# Patient Record
Sex: FEMALE | Race: BLACK OR AFRICAN AMERICAN | NOT HISPANIC OR LATINO | Employment: FULL TIME | URBAN - METROPOLITAN AREA
[De-identification: names, ages, dates, MRNs, and addresses within clinical notes are randomized per-mention and may not be internally consistent; named-entity substitution may affect disease eponyms.]

---

## 2022-02-10 ENCOUNTER — OFFICE VISIT (OUTPATIENT)
Dept: OBSTETRICS AND GYNECOLOGY | Facility: CLINIC | Age: 27
End: 2022-02-10
Payer: MEDICAID

## 2022-02-10 ENCOUNTER — LAB VISIT (OUTPATIENT)
Dept: LAB | Facility: HOSPITAL | Age: 27
End: 2022-02-10
Attending: NURSE PRACTITIONER
Payer: MEDICAID

## 2022-02-10 VITALS — SYSTOLIC BLOOD PRESSURE: 110 MMHG | DIASTOLIC BLOOD PRESSURE: 64 MMHG | WEIGHT: 169.75 LBS

## 2022-02-10 DIAGNOSIS — Z11.3 SCREEN FOR STD (SEXUALLY TRANSMITTED DISEASE): ICD-10-CM

## 2022-02-10 DIAGNOSIS — Z12.4 ENCOUNTER FOR SCREENING FOR CERVICAL CANCER: ICD-10-CM

## 2022-02-10 DIAGNOSIS — Z01.419 WELL WOMAN EXAM WITH ROUTINE GYNECOLOGICAL EXAM: Primary | ICD-10-CM

## 2022-02-10 PROCEDURE — 99385 PR PREVENTIVE VISIT,NEW,18-39: ICD-10-PCS | Mod: S$PBB,,, | Performed by: NURSE PRACTITIONER

## 2022-02-10 PROCEDURE — 3078F PR MOST RECENT DIASTOLIC BLOOD PRESSURE < 80 MM HG: ICD-10-PCS | Mod: CPTII,,, | Performed by: NURSE PRACTITIONER

## 2022-02-10 PROCEDURE — 87801 DETECT AGNT MULT DNA AMPLI: CPT | Performed by: NURSE PRACTITIONER

## 2022-02-10 PROCEDURE — 99999 PR PBB SHADOW E&M-EST. PATIENT-LVL III: CPT | Mod: PBBFAC,,, | Performed by: NURSE PRACTITIONER

## 2022-02-10 PROCEDURE — 88175 CYTOPATH C/V AUTO FLUID REDO: CPT | Performed by: NURSE PRACTITIONER

## 2022-02-10 PROCEDURE — 99999 PR PBB SHADOW E&M-EST. PATIENT-LVL III: ICD-10-PCS | Mod: PBBFAC,,, | Performed by: NURSE PRACTITIONER

## 2022-02-10 PROCEDURE — 3078F DIAST BP <80 MM HG: CPT | Mod: CPTII,,, | Performed by: NURSE PRACTITIONER

## 2022-02-10 PROCEDURE — 1160F PR REVIEW ALL MEDS BY PRESCRIBER/CLIN PHARMACIST DOCUMENTED: ICD-10-PCS | Mod: CPTII,,, | Performed by: NURSE PRACTITIONER

## 2022-02-10 PROCEDURE — 1159F PR MEDICATION LIST DOCUMENTED IN MEDICAL RECORD: ICD-10-PCS | Mod: CPTII,,, | Performed by: NURSE PRACTITIONER

## 2022-02-10 PROCEDURE — 86592 SYPHILIS TEST NON-TREP QUAL: CPT | Performed by: NURSE PRACTITIONER

## 2022-02-10 PROCEDURE — 87481 CANDIDA DNA AMP PROBE: CPT | Mod: 59 | Performed by: NURSE PRACTITIONER

## 2022-02-10 PROCEDURE — 80074 ACUTE HEPATITIS PANEL: CPT | Performed by: NURSE PRACTITIONER

## 2022-02-10 PROCEDURE — 99213 OFFICE O/P EST LOW 20 MIN: CPT | Mod: PBBFAC | Performed by: NURSE PRACTITIONER

## 2022-02-10 PROCEDURE — 1160F RVW MEDS BY RX/DR IN RCRD: CPT | Mod: CPTII,,, | Performed by: NURSE PRACTITIONER

## 2022-02-10 PROCEDURE — 3074F PR MOST RECENT SYSTOLIC BLOOD PRESSURE < 130 MM HG: ICD-10-PCS | Mod: CPTII,,, | Performed by: NURSE PRACTITIONER

## 2022-02-10 PROCEDURE — 87389 HIV-1 AG W/HIV-1&-2 AB AG IA: CPT | Performed by: NURSE PRACTITIONER

## 2022-02-10 PROCEDURE — 87591 N.GONORRHOEAE DNA AMP PROB: CPT | Mod: 59 | Performed by: NURSE PRACTITIONER

## 2022-02-10 PROCEDURE — 1159F MED LIST DOCD IN RCRD: CPT | Mod: CPTII,,, | Performed by: NURSE PRACTITIONER

## 2022-02-10 PROCEDURE — 87491 CHLMYD TRACH DNA AMP PROBE: CPT | Performed by: NURSE PRACTITIONER

## 2022-02-10 PROCEDURE — 36415 COLL VENOUS BLD VENIPUNCTURE: CPT | Performed by: NURSE PRACTITIONER

## 2022-02-10 PROCEDURE — 3074F SYST BP LT 130 MM HG: CPT | Mod: CPTII,,, | Performed by: NURSE PRACTITIONER

## 2022-02-10 PROCEDURE — 99385 PREV VISIT NEW AGE 18-39: CPT | Mod: S$PBB,,, | Performed by: NURSE PRACTITIONER

## 2022-02-10 NOTE — PATIENT INSTRUCTIONS
Patient Education       Gynecological Exam   About this topic   A gynecologic exam is sometimes called a pelvic exam. Talk with your doctor to decide when and how often you need to have pelvic exams. You may have a pelvic exam:  · As a part of a health check-up  · If you have symptoms, like vaginal discharge or pain  · To do a Pap or HPV test to screen for cervical cancer. HPV is short for human papillomavirus, which is a virus that causes cervical cancer.  · If you have a family history of cervical cancer  · If your sexual partner has a sexually-transmitted disease  · If you have bleeding between periods  · Before your doctor orders certain kinds of birth control  · At the start of a pregnancy  Your doctor will take your history. Talk to the doctor about:  · All the drugs you are taking. Be sure to include all prescription and over-the-counter (OTC) drugs and herbal supplements. Tell the doctor about any drug allergy. Bring a list of drugs you take with you.  · If you are pregnant or think you might be pregnant  · If you have had a baby and how the baby was delivered  · If you have had a miscarriage or an   · If you have your menstrual period  · If you are sexually active  · Any vaccinations you have had, such as the HPV vaccine  Be sure to let the doctor know if you have a history of an abnormal Pap test. Also, tell the doctor about:  · Past cervical procedures  · HPV or other sexually-transmitted diseases  · Vaginal secretions and infections  · Past surgery, radiation, or chemo  Your gynecologic exam is also a good time to talk with your doctor about things like:  · Birth control  · Problems with your menstrual cycle  · If you are trying to get pregnant or are having trouble getting pregnant  · Sex and sexuality  · Gender identity  · Your emotional health  · Changes in appetite  · If you dont feel safe in a relationship  · Questions about taking hormones  · Sexually-transmitted infections  · Shots to  prevent infections  · If you use tobacco, alcohol, or illegal substances  General   The doctor will ask you to lie on an exam table and put your feet in foot holders. The exam may be a little uncomfortable but should not hurt. If it would make you more comfortable, ask to have a nurse or family member with you during the exam. Remember to breathe and try to relax your stomach, butt, and leg muscles. There are a few parts to a pelvic exam:  · External exam - The doctor looks at your vulva and the opening of your vagina.  · Internal exam with a speculum - The doctor puts a special tool called a speculum into your vagina. The speculum helps keep your vagina open so the doctor is able to see your cervix. The doctor may use swabs to collect a sample of cells from your cervix during this part of the exam.  · Bimanual exam - The doctor will gently place 1 or 2 gloved fingers into your vagina and use the other hand to press slightly on your lower belly. This lets the doctor check your uterus and ovaries.  · Rectovaginal exam - The doctor may place a gloved finger in your rectum to check the muscles between your vagina and your anus. The doctor may also place a finger in your vagina at the same time.  The doctor may also do a breast exam as a part of your gynecologic exam. This is to look for lumps, cysts, or drainage. The doctor may do a test to check the cells of your cervix for:  · Cancer cells  · Cells that are not normal and may lead to cancer  · Changes in your cervix  · Swelling or infections  Talk to the doctor about when you can get any test results.  Where can I learn more?   ACOG  https://www.acog.org/womens-health/faqs/your-first-gynecologic-visit   [employer]  https://www.[employer].com/contents/cervical-cancer-screening-beyond-the-basics   Last Reviewed Date   2021-03-31  Consumer Information Use and Disclaimer   This information is not specific medical advice and does not replace information you receive from your  health care provider. This is only a brief summary of general information. It does NOT include all information about conditions, illnesses, injuries, tests, procedures, treatments, therapies, discharge instructions or life-style choices that may apply to you. You must talk with your health care provider for complete information about your health and treatment options. This information should not be used to decide whether or not to accept your health care providers advice, instructions or recommendations. Only your health care provider has the knowledge and training to provide advice that is right for you.  Copyright   Copyright © 2021 [employer] and its affiliates and/or licensors. All rights reserved.

## 2022-02-10 NOTE — PROGRESS NOTES
Subjective:       Patient ID: Luisa Callaway is a 26 y.o. female.    Chief Complaint:  Well Woman and STD CHECK    Patient's last menstrual period was 2022.  History of Present Illness  Annual Exam-Premenopausal  Patient presents for annual exam. The patient is sexually active. GYN screening history: last pap: patient does not recall results of last pap. The patient wears seatbelts: yes. The patient participates in regular exercise: no. Has the patient ever been transfused or tattooed?: yes. The patient reports that there is not domestic violence in her life.  Patient has complaints of increased vaginal discharge with associated vaginal odor.  Denies vaginal itching.  Patient does report history of recurrent bacterial vaginosis.  Patient requesting STD screening today.  Patient also requesting appointment for IUD removal.  Paragard IUD inserted 5-6 years ago. Patient reports irregular menstrual cycles and dysmenorrhea since insertion.     OB History    Para Term  AB Living   2 2 2     2   SAB IAB Ectopic Multiple Live Births           2      # Outcome Date GA Lbr Isidro/2nd Weight Sex Delivery Anes PTL Lv   2 Term 16 38w0d  2.523 kg (5 lb 9 oz) F Vag-Spont EPI  JOSE   1 Term 09/20/15 38w0d  2.523 kg (5 lb 9 oz) F Vag-Spont EPI  JOSE       Review of Systems  Review of Systems   Constitutional: Negative for appetite change, fatigue, fever and unexpected weight change.   Eyes: Negative for visual disturbance.   Cardiovascular: Negative for chest pain.   Gastrointestinal: Negative for abdominal pain, bloating, constipation, diarrhea, nausea and vomiting.   Genitourinary: Positive for dysmenorrhea, menstrual problem, vaginal discharge and vaginal odor. Negative for bladder incontinence, dyspareunia, dysuria, flank pain, frequency, genital sores, menorrhagia, pelvic pain, urgency, vaginal bleeding, vaginal pain, postcoital bleeding and vaginal dryness.   Integumentary:  Negative for rash, acne,  mole/lesion, breast mass, nipple discharge, breast skin changes and breast tenderness.   Neurological: Negative for syncope and headaches.   Hematological: Negative for adenopathy. Does not bruise/bleed easily.   All other systems reviewed and are negative.  Breast: Positive for breast self exam.Negative for asymmetry, lump, mass, nipple discharge, skin changes and tenderness           Objective:      Physical Exam:   Constitutional: She is oriented to person, place, and time. She appears well-developed and well-nourished.    HENT:   Head: Normocephalic and atraumatic.    Eyes: Pupils are equal, round, and reactive to light. Conjunctivae and EOM are normal.     Cardiovascular: Normal rate and regular rhythm.     Pulmonary/Chest: Effort normal. Right breast exhibits no inverted nipple, no mass, no nipple discharge, no skin change, no tenderness, no bleeding and no swelling. Left breast exhibits no inverted nipple, no mass, no nipple discharge, no skin change, no tenderness, no bleeding and no swelling. Breasts are symmetrical.        Abdominal: Soft. Hernia confirmed negative in the right inguinal area and confirmed negative in the left inguinal area.     Genitourinary:    Inguinal canal, uterus, right adnexa, left adnexa and rectum normal.      Pelvic exam was performed with patient supine.   The external female genitalia was normal.   Genitalia hair distrobution normal .   Labial bartholins normal.There is no rash, tenderness, lesion or injury on the right labia. There is no rash, tenderness, lesion or injury on the left labia. Cervix is normal. There is vaginal discharge (White, thin, creamy) in the vagina. No erythema, bleeding, rectocele, cystocele or unspecified prolapse of vaginal walls in the vagina. Cervix exhibits no motion tenderness and no friability. Uterus is not tender.           Musculoskeletal: Normal range of motion and moves all extremeties.      Lymphadenopathy: No inguinal adenopathy noted on the  right or left side.    Neurological: She is alert and oriented to person, place, and time.    Skin: Skin is warm and dry. No rash noted. No erythema.    Psychiatric: She has a normal mood and affect. Her behavior is normal. Judgment and thought content normal.            Assessment:     1. Well woman exam with routine gynecological exam    2. Encounter for screening for cervical cancer    3. Screen for STD (sexually transmitted disease)              Plan:   Luisa was seen today for well woman and std check.    Diagnoses and all orders for this visit:    Well woman exam with routine gynecological exam  -     Liquid-Based Pap Smear, Screening    Encounter for screening for cervical cancer  -     Liquid-Based Pap Smear, Screening    Screen for STD (sexually transmitted disease)  -     HIV 1/2 Ag/Ab (4th Gen); Future  -     RPR; Future  -     Hepatitis Panel, Acute; Future  -     C. trachomatis/N. gonorrhoeae by AMP DNA  -     Vaginosis Screen by DNA Probe    Will schedule appt for IUD removal.   Follow up with me in 1 year for annual well woman exam.

## 2022-02-11 LAB
HAV IGM SERPL QL IA: NEGATIVE
HBV CORE IGM SERPL QL IA: NEGATIVE
HBV SURFACE AG SERPL QL IA: NEGATIVE
HCV AB SERPL QL IA: NEGATIVE
HIV 1+2 AB+HIV1 P24 AG SERPL QL IA: NEGATIVE
RPR SER QL: NORMAL

## 2022-02-12 LAB
C TRACH DNA SPEC QL NAA+PROBE: NOT DETECTED
N GONORRHOEA DNA SPEC QL NAA+PROBE: NOT DETECTED

## 2022-02-14 ENCOUNTER — PROCEDURE VISIT (OUTPATIENT)
Dept: OBSTETRICS AND GYNECOLOGY | Facility: CLINIC | Age: 27
End: 2022-02-14
Payer: MEDICAID

## 2022-02-14 VITALS — SYSTOLIC BLOOD PRESSURE: 124 MMHG | WEIGHT: 169.75 LBS | DIASTOLIC BLOOD PRESSURE: 86 MMHG

## 2022-02-14 DIAGNOSIS — Z30.432 ENCOUNTER FOR IUD REMOVAL: Primary | ICD-10-CM

## 2022-02-14 PROCEDURE — 58301 REMOVE INTRAUTERINE DEVICE: CPT | Mod: PBBFAC | Performed by: NURSE PRACTITIONER

## 2022-02-14 PROCEDURE — 58301 REMOVAL OF INTRAUTERINE DEVICE-TODAY: ICD-10-PCS | Mod: S$PBB,,, | Performed by: NURSE PRACTITIONER

## 2022-02-14 PROCEDURE — 58301 REMOVE INTRAUTERINE DEVICE: CPT | Mod: S$PBB,,, | Performed by: NURSE PRACTITIONER

## 2022-02-14 NOTE — PROCEDURES
Removal of Intrauterine Device-Today    Date/Time: 2/14/2022 8:45 AM  Performed by: Candis Mcdonnell NP  Authorized by: Candis Mcdonnell NP     Consent obtained:  Verbal  Consent given by:  Patient  Procedure risks and benefits discussed: yes    Patient questions answered: yes    Patient agrees, verbalizes understanding, and wants to proceed: yes    Educational handouts given: yes    Instructions and paperwork completed: yes    IUD grasped by: ring forceps  Removed with no complications: yes     Patient desires to proceed with abstinence for contraception.

## 2022-02-15 DIAGNOSIS — N76.0 BACTERIAL VAGINOSIS: Primary | ICD-10-CM

## 2022-02-15 DIAGNOSIS — B96.89 BACTERIAL VAGINOSIS: Primary | ICD-10-CM

## 2022-02-15 LAB
BACTERIAL VAGINOSIS DNA: POSITIVE
CANDIDA GLABRATA DNA: NEGATIVE
CANDIDA KRUSEI DNA: NEGATIVE
CANDIDA RRNA VAG QL PROBE: NEGATIVE
T VAGINALIS RRNA GENITAL QL PROBE: NEGATIVE

## 2022-02-15 RX ORDER — METRONIDAZOLE 500 MG/1
500 TABLET ORAL 2 TIMES DAILY
Qty: 14 TABLET | Refills: 0 | Status: SHIPPED | OUTPATIENT
Start: 2022-02-15 | End: 2022-02-22

## 2022-11-11 ENCOUNTER — OFFICE VISIT (OUTPATIENT)
Dept: OBSTETRICS AND GYNECOLOGY | Facility: CLINIC | Age: 27
End: 2022-11-11
Payer: MEDICAID

## 2022-11-11 ENCOUNTER — LAB VISIT (OUTPATIENT)
Dept: LAB | Facility: HOSPITAL | Age: 27
End: 2022-11-11
Attending: ADVANCED PRACTICE MIDWIFE
Payer: MEDICAID

## 2022-11-11 VITALS
WEIGHT: 160.25 LBS | HEIGHT: 62 IN | DIASTOLIC BLOOD PRESSURE: 86 MMHG | SYSTOLIC BLOOD PRESSURE: 124 MMHG | BODY MASS INDEX: 29.49 KG/M2

## 2022-11-11 DIAGNOSIS — N76.0 BV (BACTERIAL VAGINOSIS): ICD-10-CM

## 2022-11-11 DIAGNOSIS — Z32.01 PREGNANCY EXAMINATION OR TEST, POSITIVE RESULT: ICD-10-CM

## 2022-11-11 DIAGNOSIS — B96.89 BV (BACTERIAL VAGINOSIS): ICD-10-CM

## 2022-11-11 DIAGNOSIS — A74.9 CHLAMYDIA INFECTION AFFECTING PREGNANCY IN FIRST TRIMESTER: ICD-10-CM

## 2022-11-11 DIAGNOSIS — O98.811 CHLAMYDIA INFECTION AFFECTING PREGNANCY IN FIRST TRIMESTER: ICD-10-CM

## 2022-11-11 DIAGNOSIS — Z32.00 POSSIBLE PREGNANCY, NOT CONFIRMED: Primary | ICD-10-CM

## 2022-11-11 LAB
BASOPHILS # BLD AUTO: 0.07 K/UL (ref 0–0.2)
BASOPHILS NFR BLD: 0.8 % (ref 0–1.9)
DIFFERENTIAL METHOD: ABNORMAL
EOSINOPHIL # BLD AUTO: 0.1 K/UL (ref 0–0.5)
EOSINOPHIL NFR BLD: 1.4 % (ref 0–8)
ERYTHROCYTE [DISTWIDTH] IN BLOOD BY AUTOMATED COUNT: 12.2 % (ref 11.5–14.5)
ESTIMATED AVG GLUCOSE: 85 MG/DL (ref 68–131)
HBA1C MFR BLD: 4.6 % (ref 4–5.6)
HCT VFR BLD AUTO: 40.1 % (ref 37–48.5)
HGB BLD-MCNC: 13.3 G/DL (ref 12–16)
IMM GRANULOCYTES # BLD AUTO: 0.04 K/UL (ref 0–0.04)
IMM GRANULOCYTES NFR BLD AUTO: 0.4 % (ref 0–0.5)
LYMPHOCYTES # BLD AUTO: 3.1 K/UL (ref 1–4.8)
LYMPHOCYTES NFR BLD: 34.2 % (ref 18–48)
MCH RBC QN AUTO: 32 PG (ref 27–31)
MCHC RBC AUTO-ENTMCNC: 33.2 G/DL (ref 32–36)
MCV RBC AUTO: 96 FL (ref 82–98)
MONOCYTES # BLD AUTO: 0.5 K/UL (ref 0.3–1)
MONOCYTES NFR BLD: 5.6 % (ref 4–15)
NEUTROPHILS # BLD AUTO: 5.2 K/UL (ref 1.8–7.7)
NEUTROPHILS NFR BLD: 57.6 % (ref 38–73)
NRBC BLD-RTO: 0 /100 WBC
PLATELET # BLD AUTO: 287 K/UL (ref 150–450)
PMV BLD AUTO: 12.1 FL (ref 9.2–12.9)
RBC # BLD AUTO: 4.16 M/UL (ref 4–5.4)
WBC # BLD AUTO: 9 K/UL (ref 3.9–12.7)

## 2022-11-11 PROCEDURE — 3008F PR BODY MASS INDEX (BMI) DOCUMENTED: ICD-10-PCS | Mod: CPTII,,, | Performed by: ADVANCED PRACTICE MIDWIFE

## 2022-11-11 PROCEDURE — 87389 HIV-1 AG W/HIV-1&-2 AB AG IA: CPT | Performed by: ADVANCED PRACTICE MIDWIFE

## 2022-11-11 PROCEDURE — 99999 PR PBB SHADOW E&M-EST. PATIENT-LVL III: ICD-10-PCS | Mod: PBBFAC,,, | Performed by: ADVANCED PRACTICE MIDWIFE

## 2022-11-11 PROCEDURE — 3074F PR MOST RECENT SYSTOLIC BLOOD PRESSURE < 130 MM HG: ICD-10-PCS | Mod: CPTII,,, | Performed by: ADVANCED PRACTICE MIDWIFE

## 2022-11-11 PROCEDURE — 83020 HEMOGLOBIN ELECTROPHORESIS: CPT | Mod: 91 | Performed by: ADVANCED PRACTICE MIDWIFE

## 2022-11-11 PROCEDURE — 3044F HG A1C LEVEL LT 7.0%: CPT | Mod: CPTII,,, | Performed by: ADVANCED PRACTICE MIDWIFE

## 2022-11-11 PROCEDURE — 86803 HEPATITIS C AB TEST: CPT | Performed by: ADVANCED PRACTICE MIDWIFE

## 2022-11-11 PROCEDURE — 85025 COMPLETE CBC W/AUTO DIFF WBC: CPT | Performed by: ADVANCED PRACTICE MIDWIFE

## 2022-11-11 PROCEDURE — 3079F PR MOST RECENT DIASTOLIC BLOOD PRESSURE 80-89 MM HG: ICD-10-PCS | Mod: CPTII,,, | Performed by: ADVANCED PRACTICE MIDWIFE

## 2022-11-11 PROCEDURE — 87340 HEPATITIS B SURFACE AG IA: CPT | Performed by: ADVANCED PRACTICE MIDWIFE

## 2022-11-11 PROCEDURE — 86592 SYPHILIS TEST NON-TREP QUAL: CPT | Performed by: ADVANCED PRACTICE MIDWIFE

## 2022-11-11 PROCEDURE — 3074F SYST BP LT 130 MM HG: CPT | Mod: CPTII,,, | Performed by: ADVANCED PRACTICE MIDWIFE

## 2022-11-11 PROCEDURE — 36415 COLL VENOUS BLD VENIPUNCTURE: CPT | Performed by: ADVANCED PRACTICE MIDWIFE

## 2022-11-11 PROCEDURE — 87086 URINE CULTURE/COLONY COUNT: CPT | Performed by: ADVANCED PRACTICE MIDWIFE

## 2022-11-11 PROCEDURE — 1159F MED LIST DOCD IN RCRD: CPT | Mod: CPTII,,, | Performed by: ADVANCED PRACTICE MIDWIFE

## 2022-11-11 PROCEDURE — 99204 PR OFFICE/OUTPT VISIT, NEW, LEVL IV, 45-59 MIN: ICD-10-PCS | Mod: S$PBB,TH,, | Performed by: ADVANCED PRACTICE MIDWIFE

## 2022-11-11 PROCEDURE — 86901 BLOOD TYPING SEROLOGIC RH(D): CPT | Performed by: ADVANCED PRACTICE MIDWIFE

## 2022-11-11 PROCEDURE — 3079F DIAST BP 80-89 MM HG: CPT | Mod: CPTII,,, | Performed by: ADVANCED PRACTICE MIDWIFE

## 2022-11-11 PROCEDURE — 87491 CHLMYD TRACH DNA AMP PROBE: CPT | Performed by: ADVANCED PRACTICE MIDWIFE

## 2022-11-11 PROCEDURE — 3044F PR MOST RECENT HEMOGLOBIN A1C LEVEL <7.0%: ICD-10-PCS | Mod: CPTII,,, | Performed by: ADVANCED PRACTICE MIDWIFE

## 2022-11-11 PROCEDURE — 1159F PR MEDICATION LIST DOCUMENTED IN MEDICAL RECORD: ICD-10-PCS | Mod: CPTII,,, | Performed by: ADVANCED PRACTICE MIDWIFE

## 2022-11-11 PROCEDURE — 83036 HEMOGLOBIN GLYCOSYLATED A1C: CPT | Performed by: ADVANCED PRACTICE MIDWIFE

## 2022-11-11 PROCEDURE — 99204 OFFICE O/P NEW MOD 45 MIN: CPT | Mod: S$PBB,TH,, | Performed by: ADVANCED PRACTICE MIDWIFE

## 2022-11-11 PROCEDURE — 87591 N.GONORRHOEAE DNA AMP PROB: CPT | Performed by: ADVANCED PRACTICE MIDWIFE

## 2022-11-11 PROCEDURE — 80053 COMPREHEN METABOLIC PANEL: CPT | Performed by: ADVANCED PRACTICE MIDWIFE

## 2022-11-11 PROCEDURE — 81514 NFCT DS BV&VAGINITIS DNA ALG: CPT | Performed by: ADVANCED PRACTICE MIDWIFE

## 2022-11-11 PROCEDURE — 83021 HEMOGLOBIN CHROMOTOGRAPHY: CPT | Performed by: ADVANCED PRACTICE MIDWIFE

## 2022-11-11 PROCEDURE — 86762 RUBELLA ANTIBODY: CPT | Performed by: ADVANCED PRACTICE MIDWIFE

## 2022-11-11 PROCEDURE — 99213 OFFICE O/P EST LOW 20 MIN: CPT | Mod: PBBFAC,TH | Performed by: ADVANCED PRACTICE MIDWIFE

## 2022-11-11 PROCEDURE — 99999 PR PBB SHADOW E&M-EST. PATIENT-LVL III: CPT | Mod: PBBFAC,,, | Performed by: ADVANCED PRACTICE MIDWIFE

## 2022-11-11 PROCEDURE — 3008F BODY MASS INDEX DOCD: CPT | Mod: CPTII,,, | Performed by: ADVANCED PRACTICE MIDWIFE

## 2022-11-12 ENCOUNTER — PATIENT MESSAGE (OUTPATIENT)
Dept: OBSTETRICS AND GYNECOLOGY | Facility: CLINIC | Age: 27
End: 2022-11-12
Payer: MEDICAID

## 2022-11-12 PROBLEM — A74.9 CHLAMYDIA INFECTION AFFECTING PREGNANCY IN FIRST TRIMESTER: Status: ACTIVE | Noted: 2022-11-12

## 2022-11-12 PROBLEM — N76.0 BV (BACTERIAL VAGINOSIS): Status: ACTIVE | Noted: 2022-11-12

## 2022-11-12 PROBLEM — O98.811 CHLAMYDIA INFECTION AFFECTING PREGNANCY IN FIRST TRIMESTER: Status: ACTIVE | Noted: 2022-11-12

## 2022-11-12 PROBLEM — B96.89 BV (BACTERIAL VAGINOSIS): Status: ACTIVE | Noted: 2022-11-12

## 2022-11-12 LAB
ABO + RH BLD: NORMAL
ALBUMIN SERPL BCP-MCNC: 4.4 G/DL (ref 3.5–5.2)
ALP SERPL-CCNC: 52 U/L (ref 55–135)
ALT SERPL W/O P-5'-P-CCNC: 6 U/L (ref 10–44)
ANION GAP SERPL CALC-SCNC: 9 MMOL/L (ref 8–16)
AST SERPL-CCNC: 11 U/L (ref 10–40)
BACTERIAL VAGINOSIS DNA: POSITIVE
BILIRUB SERPL-MCNC: 0.7 MG/DL (ref 0.1–1)
BLD GP AB SCN CELLS X3 SERPL QL: NORMAL
BUN SERPL-MCNC: 8 MG/DL (ref 6–20)
C TRACH DNA SPEC QL NAA+PROBE: DETECTED
CALCIUM SERPL-MCNC: 9.2 MG/DL (ref 8.7–10.5)
CANDIDA GLABRATA DNA: NEGATIVE
CANDIDA KRUSEI DNA: NEGATIVE
CANDIDA RRNA VAG QL PROBE: NEGATIVE
CHLORIDE SERPL-SCNC: 105 MMOL/L (ref 95–110)
CO2 SERPL-SCNC: 22 MMOL/L (ref 23–29)
CREAT SERPL-MCNC: 0.7 MG/DL (ref 0.5–1.4)
EST. GFR  (NO RACE VARIABLE): >60 ML/MIN/1.73 M^2
GLUCOSE SERPL-MCNC: 84 MG/DL (ref 70–110)
HBV SURFACE AG SERPL QL IA: NORMAL
HCV AB SERPL QL IA: NORMAL
HIV 1+2 AB+HIV1 P24 AG SERPL QL IA: NORMAL
N GONORRHOEA DNA SPEC QL NAA+PROBE: NOT DETECTED
POTASSIUM SERPL-SCNC: 4 MMOL/L (ref 3.5–5.1)
PROT SERPL-MCNC: 7.2 G/DL (ref 6–8.4)
RPR SER QL: NORMAL
SODIUM SERPL-SCNC: 136 MMOL/L (ref 136–145)
T VAGINALIS RRNA GENITAL QL PROBE: NEGATIVE

## 2022-11-12 RX ORDER — METRONIDAZOLE 500 MG/1
500 TABLET ORAL EVERY 12 HOURS
Qty: 14 TABLET | Refills: 0 | Status: SHIPPED | OUTPATIENT
Start: 2022-11-12 | End: 2022-11-19

## 2022-11-12 RX ORDER — AZITHROMYCIN 500 MG/1
1000 TABLET, FILM COATED ORAL ONCE
Qty: 2 TABLET | Refills: 0 | Status: SHIPPED | OUTPATIENT
Start: 2022-11-12 | End: 2022-11-12

## 2022-11-12 NOTE — PROGRESS NOTES
Subjective:       Patient ID: Luisa Callaway is a 27 y.o. female.    Chief Complaint: Possible Pregnancy    Presents for amenorrhea, + UPT  LMP 8/4/22 approximate  Planned pregnancy.  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      Objective:      Physical Exam  Vitals reviewed.   Constitutional:       Appearance: Normal appearance.   Pulmonary:      Effort: Pulmonary effort is normal.   Abdominal:      Palpations: Abdomen is soft.   Genitourinary:     General: Normal vulva.      Comments: Pap up to date  Uterus 8-10 week size, mobile non-tender  Adnexa negative bilaterally  Musculoskeletal:         General: Normal range of motion.   Skin:     General: Skin is warm and dry.   Neurological:      General: No focal deficit present.      Mental Status: She is alert and oriented to person, place, and time. Mental status is at baseline.   Psychiatric:         Mood and Affect: Mood normal.         Behavior: Behavior normal.         Thought Content: Thought content normal.         Judgment: Judgment normal.       Assessment:       Problem List Items Addressed This Visit          Renal/    Pregnancy examination or test, positive result    Relevant Orders    Rubella antibody, IgG    Urine culture    Hemoglobin Electrophoresis,Hgb A2 Ashutosh.    US OB/GYN Procedure ([facility])-Future    BV (bacterial vaginosis)       ID    Chlamydia infection affecting pregnancy in first trimester     Other Visit Diagnoses       Possible pregnancy, not confirmed    -  Primary    Relevant Orders    POCT Urine Pregnancy              Plan:       NOB labs and cultures today  RTC 1-2 weeks with US and NOB visit    I spent a total of 20 minutes on the day of the visit.This includes face to face time and non-face to face time preparing to see the patient (eg, review of  tests), obtaining and/or reviewing separately obtained history, documenting clinical information in the electronic or other health record, independently interpreting results and communicating results to the patient/family/caregiver, or care coordinator.          Additional Notes: The E/M service provided during this visit was medically necessary, significant, and independent from the procedure(s) provided on the same date of service and included documented elements above and beyond the usual pre-, intra-, and post-operative work associated with the procedure(s). Quality 111:Pneumonia Vaccination Status For Older Adults: Pneumococcal Vaccination Previously Received Quality 226: Preventive Care And Screening: Tobacco Use: Screening And Cessation Intervention: Patient screened for tobacco use and is an ex/non-smoker Quality 431: Preventive Care And Screening: Unhealthy Alcohol Use - Screening: Patient not identified as an unhealthy alcohol user when screened for unhealthy alcohol use using a systematic screening method Quality 47: Advance Care Plan: Advance care planning not documented, reason not otherwise specified. Quality 130: Documentation Of Current Medications In The Medical Record: Current Medications Documented Detail Level: Detailed

## 2022-11-13 LAB — BACTERIA UR CULT: NORMAL

## 2022-11-14 LAB
RUBV IGG SER-ACNC: 19.6 IU/ML
RUBV IGG SER-IMP: REACTIVE

## 2022-11-15 LAB
HB ELECTROPHORESIS INTERP CANCEL: NORMAL
HB ELECTROPHORESIS INTERPRETATION: NORMAL
HGB A MFR BLD ELPH: 97 % (ref 95.8–98)
HGB A2 MFR BLD: 3 % (ref 2–3.3)
HGB A2+XXX MFR BLD ELPH: NORMAL %
HGB F MFR BLD: 0 % (ref 0–0.9)
HGB XXX MFR BLD ELPH: NORMAL %
HPLC HB VARIANT: NORMAL

## 2022-11-18 ENCOUNTER — PROCEDURE VISIT (OUTPATIENT)
Dept: OBSTETRICS AND GYNECOLOGY | Facility: CLINIC | Age: 27
End: 2022-11-18
Payer: MEDICAID

## 2022-11-18 ENCOUNTER — INITIAL PRENATAL (OUTPATIENT)
Dept: OBSTETRICS AND GYNECOLOGY | Facility: CLINIC | Age: 27
End: 2022-11-18
Payer: MEDICAID

## 2022-11-18 VITALS
WEIGHT: 163.13 LBS | SYSTOLIC BLOOD PRESSURE: 108 MMHG | BODY MASS INDEX: 29.84 KG/M2 | DIASTOLIC BLOOD PRESSURE: 74 MMHG

## 2022-11-18 DIAGNOSIS — Z3A.01 6 WEEKS GESTATION OF PREGNANCY: Primary | ICD-10-CM

## 2022-11-18 DIAGNOSIS — Z34.81 ENCOUNTER FOR SUPERVISION OF OTHER NORMAL PREGNANCY, FIRST TRIMESTER: ICD-10-CM

## 2022-11-18 DIAGNOSIS — Z32.01 PREGNANCY EXAMINATION OR TEST, POSITIVE RESULT: ICD-10-CM

## 2022-11-18 PROCEDURE — 76817 TRANSVAGINAL US OBSTETRIC: CPT | Mod: PBBFAC | Performed by: OBSTETRICS & GYNECOLOGY

## 2022-11-18 PROCEDURE — 99213 OFFICE O/P EST LOW 20 MIN: CPT | Mod: TH,S$PBB,25,

## 2022-11-18 PROCEDURE — 76817 US OB/GYN PROCEDURE (VIEWPOINT): ICD-10-PCS | Mod: 26,S$PBB,, | Performed by: OBSTETRICS & GYNECOLOGY

## 2022-11-18 PROCEDURE — 99212 OFFICE O/P EST SF 10 MIN: CPT | Mod: PBBFAC,TH

## 2022-11-18 PROCEDURE — 99213 PR OFFICE/OUTPT VISIT, EST, LEVL III, 20-29 MIN: ICD-10-PCS | Mod: TH,S$PBB,25,

## 2022-11-18 PROCEDURE — 99999 PR PBB SHADOW E&M-EST. PATIENT-LVL II: ICD-10-PCS | Mod: PBBFAC,,,

## 2022-11-18 PROCEDURE — 99999 PR PBB SHADOW E&M-EST. PATIENT-LVL II: CPT | Mod: PBBFAC,,,

## 2022-11-20 PROBLEM — Z32.01 PREGNANCY EXAMINATION OR TEST, POSITIVE RESULT: Status: RESOLVED | Noted: 2022-11-11 | Resolved: 2022-11-20

## 2022-11-20 PROBLEM — Z34.81 ENCOUNTER FOR SUPERVISION OF OTHER NORMAL PREGNANCY, FIRST TRIMESTER: Status: ACTIVE | Noted: 2022-11-20

## 2022-11-20 NOTE — PROGRESS NOTES
Chief Complaint   Patient presents with    Initial Prenatal Visit       27 y.o.,  at 6w1d by US on 22    Patient presents today for initial prenatal visit.    Complaints today: .  Doing well overall.     ROS  OBSTETRICS:   Cramping no   Bleeding no    GASTRO:   Nausea no   Vomiting no      OB History    Para Term  AB Living   3 2 2     2   SAB IAB Ectopic Multiple Live Births           2      # Outcome Date GA Lbr Isidro/2nd Weight Sex Delivery Anes PTL Lv   3 Current            2 Term 16 38w0d  2.523 kg (5 lb 9 oz) F Vag-Spont EPI  JOSE   1 Term 09/20/15 38w0d  2.523 kg (5 lb 9 oz) F Vag-Spont EPI  JOSE       Allergies and problem list reviewed and updated  Medical and surgical history reviewed    PHYSICAL EXAM  /74   Wt 74 kg (163 lb 2.3 oz)   LMP 2022   BMI 29.84 kg/m²     GENERAL: No acute distress  NEURO: Alert and oriented x3  PSYCH: Calm, normal mood and affect      ASSESSMENT AND PLAN    G3 Problems (from 22 to present)     No problems associated with this episode.            Initial labs reviewed: H/H 13.3/40.1, HIV Negative, RPR negative, blood type: O+, GC/CT positive chlamydia  Dating u/s reviewed: SVIUP with cardiac activity, , LOWELL 23, EGA 6w1d, fibroid 13 x 11 x 13mm  Urine culture  Normal.  Counseled today on proper weight gain based on the [place] [employer]'s recommendations based on her pre-pregnancy weight. Reviewed potential risks to excessive weight gain.  - Discussed IOM recommended weight gain of:   Weight category Pre pre BMI  Recommended TWG   Underweight Less than 18.5 28-40    Normal Weight 18.5-24.9  25-35    Overweight 25-29.9  15-25    Obese   30 and greater  11-20      Pre-pregnancy BMI over 40 or excess pregnancy weight gain defined as:   Pre-preg BMI < 18.5; Excess weight gain = > 60 pound   Pre-preg BMI 18.5-24.9;  Excess weight gain = > 53 pounds   Pre-preg BMI 25-29.9;  Excess weight gain = > 38 pounds   Pre-preg BMI  > 30;  Excess weight gain = > 30 pounds      Discussed prenatal vitamin options (i.e. stool softener, DHA).    Pregnancy course discussed and 'AtoZ' book given. Patient was counseled on proper weight gain based on the [place] [employer]'s recommendations based on her pre-pregnancy weight. Discussed foods to avoid in pregnancy (i.e. sushi, fish that are high in mercury, deli meat, and unpasteurized cheeses). Discussed prenatal vitamin options (i.e. stool softener, DHA). Discussed potential medical problems in pregnancy.  Oriented to practice including CNM collaboration.   Questions answered.    Reviewed aneuploidy screening options and indications, questions answered - patient discussed. Patient will think about it.  Education regarding warning signs.  Completed chlamydia abx. LATHA in 3-4 week    Follow up: 4 wks, call or present sooner prn.

## 2022-12-15 ENCOUNTER — PATIENT MESSAGE (OUTPATIENT)
Dept: ADMINISTRATIVE | Facility: OTHER | Age: 27
End: 2022-12-15
Payer: MEDICAID

## 2022-12-15 ENCOUNTER — ROUTINE PRENATAL (OUTPATIENT)
Dept: OBSTETRICS AND GYNECOLOGY | Facility: CLINIC | Age: 27
End: 2022-12-15
Payer: MEDICAID

## 2022-12-15 ENCOUNTER — LAB VISIT (OUTPATIENT)
Dept: LAB | Facility: HOSPITAL | Age: 27
End: 2022-12-15
Payer: MEDICAID

## 2022-12-15 VITALS
WEIGHT: 155.88 LBS | SYSTOLIC BLOOD PRESSURE: 137 MMHG | DIASTOLIC BLOOD PRESSURE: 94 MMHG | BODY MASS INDEX: 28.51 KG/M2

## 2022-12-15 DIAGNOSIS — Z3A.10 10 WEEKS GESTATION OF PREGNANCY: Primary | ICD-10-CM

## 2022-12-15 DIAGNOSIS — O21.9 NAUSEA/VOMITING IN PREGNANCY: ICD-10-CM

## 2022-12-15 DIAGNOSIS — A74.9 CHLAMYDIA INFECTION AFFECTING PREGNANCY IN FIRST TRIMESTER: ICD-10-CM

## 2022-12-15 DIAGNOSIS — Z13.79 GENETIC TESTING: ICD-10-CM

## 2022-12-15 DIAGNOSIS — O98.811 CHLAMYDIA INFECTION AFFECTING PREGNANCY IN FIRST TRIMESTER: ICD-10-CM

## 2022-12-15 PROBLEM — F32.9 MAJOR DEPRESSIVE DISORDER WITHOUT PSYCHOTIC FEATURES: Status: ACTIVE | Noted: 2018-08-24

## 2022-12-15 PROBLEM — F12.20 CANNABIS DEPENDENCE: Status: ACTIVE | Noted: 2018-08-28

## 2022-12-15 PROBLEM — F43.10 POST TRAUMATIC STRESS DISORDER (PTSD): Status: ACTIVE | Noted: 2018-08-24

## 2022-12-15 PROCEDURE — 87591 N.GONORRHOEAE DNA AMP PROB: CPT

## 2022-12-15 PROCEDURE — 99999 PR PBB SHADOW E&M-EST. PATIENT-LVL II: ICD-10-PCS | Mod: PBBFAC,,,

## 2022-12-15 PROCEDURE — 87491 CHLMYD TRACH DNA AMP PROBE: CPT

## 2022-12-15 PROCEDURE — 99212 OFFICE O/P EST SF 10 MIN: CPT | Mod: PBBFAC,TH

## 2022-12-15 PROCEDURE — 99999 PR PBB SHADOW E&M-EST. PATIENT-LVL II: CPT | Mod: PBBFAC,,,

## 2022-12-15 PROCEDURE — 36415 COLL VENOUS BLD VENIPUNCTURE: CPT

## 2022-12-15 PROCEDURE — 99213 OFFICE O/P EST LOW 20 MIN: CPT | Mod: TH,S$PBB,,

## 2022-12-15 PROCEDURE — 99213 PR OFFICE/OUTPT VISIT, EST, LEVL III, 20-29 MIN: ICD-10-PCS | Mod: TH,S$PBB,,

## 2022-12-15 RX ORDER — PROMETHAZINE HYDROCHLORIDE 25 MG/1
25 TABLET ORAL EVERY 4 HOURS PRN
Qty: 60 TABLET | Refills: 0 | Status: SHIPPED | OUTPATIENT
Start: 2022-12-15

## 2022-12-15 RX ORDER — ONDANSETRON 4 MG/1
4 TABLET, FILM COATED ORAL EVERY 6 HOURS PRN
Qty: 60 TABLET | Refills: 3 | Status: SHIPPED | OUTPATIENT
Start: 2022-12-15

## 2022-12-15 NOTE — PROGRESS NOTES
27 y.o. female  at 10w0d   denies VB or cramping    Doing ok.  First trimester s/s improving: no  N/V getting worse.  Has tried peppermint oil and radames without relief.  Zofran and phenergan rx sent to pharmacy.   Chlamydia LATHA today  TWG: -7 lbs   Genetic testing today  Connected MoMs    Reviewed warning signs, pregnancy precautions and how/when to call. Patient states understanding.  RTC x 4 wks, call or present sooner prn.     I spent a total of 20 minutes on the day of the visit.This includes face to face time and non-face to face time preparing to see the patient (eg, review of tests), Obtaining and/or reviewing separately obtained history, Documenting clinical information in the electronic or other health record, Independently interpreting resultsand communicating results to the patient/family/caregiver, or Care coordination.

## 2022-12-16 LAB
C TRACH DNA SPEC QL NAA+PROBE: NOT DETECTED
N GONORRHOEA DNA SPEC QL NAA+PROBE: NOT DETECTED

## 2023-01-12 ENCOUNTER — ROUTINE PRENATAL (OUTPATIENT)
Dept: OBSTETRICS AND GYNECOLOGY | Facility: CLINIC | Age: 28
End: 2023-01-12
Payer: MEDICAID

## 2023-01-12 VITALS
SYSTOLIC BLOOD PRESSURE: 112 MMHG | BODY MASS INDEX: 28.55 KG/M2 | DIASTOLIC BLOOD PRESSURE: 60 MMHG | WEIGHT: 156.06 LBS

## 2023-01-12 DIAGNOSIS — Z3A.14 14 WEEKS GESTATION OF PREGNANCY: Primary | ICD-10-CM

## 2023-01-12 DIAGNOSIS — Z36.89 ENCOUNTER FOR FETAL ANATOMIC SURVEY: ICD-10-CM

## 2023-01-12 PROBLEM — N76.0 BV (BACTERIAL VAGINOSIS): Status: RESOLVED | Noted: 2022-11-12 | Resolved: 2023-01-12

## 2023-01-12 PROBLEM — B96.89 BV (BACTERIAL VAGINOSIS): Status: RESOLVED | Noted: 2022-11-12 | Resolved: 2023-01-12

## 2023-01-12 PROCEDURE — 99999 PR PBB SHADOW E&M-EST. PATIENT-LVL II: ICD-10-PCS | Mod: PBBFAC,,,

## 2023-01-12 PROCEDURE — 99213 OFFICE O/P EST LOW 20 MIN: CPT | Mod: TH,S$PBB,,

## 2023-01-12 PROCEDURE — 99999 PR PBB SHADOW E&M-EST. PATIENT-LVL II: CPT | Mod: PBBFAC,,,

## 2023-01-12 PROCEDURE — 99213 PR OFFICE/OUTPT VISIT, EST, LEVL III, 20-29 MIN: ICD-10-PCS | Mod: TH,S$PBB,,

## 2023-01-12 PROCEDURE — 99212 OFFICE O/P EST SF 10 MIN: CPT | Mod: PBBFAC,TH

## 2023-01-12 NOTE — PROGRESS NOTES
27 y.o. female  at 14w0d   is not feeling flutters yet/FM, denies VB, LOF or cramping  Doing well without concerns. Nausea and vomiting is manageable with medication. Will continue to take as needed.   Chlamydia LATHA negative  TWG: -7 lbs   Anatomy US NV  Genetic testing results reviewed; its a Girl  Connected MoMs    Reviewed warning signs, normal FM,  labor precautions and how/when to call. Patient states understanding.  RTC x 6 wks, call or present sooner prn.     I spent a total of 20 minutes on the day of the visit.This includes face to face time and non-face to face time preparing to see the patient (eg, review of tests), Obtaining and/or reviewing separately obtained history, Documenting clinical information in the electronic or other health record, Independently interpreting resultsand communicating results to the patient/family/caregiver, or Care coordination.

## 2023-01-16 ENCOUNTER — PATIENT MESSAGE (OUTPATIENT)
Dept: OBSTETRICS AND GYNECOLOGY | Facility: CLINIC | Age: 28
End: 2023-01-16
Payer: MEDICAID

## 2023-01-17 DIAGNOSIS — N76.0 BV (BACTERIAL VAGINOSIS): Primary | ICD-10-CM

## 2023-01-17 DIAGNOSIS — B96.89 BV (BACTERIAL VAGINOSIS): Primary | ICD-10-CM

## 2023-01-17 RX ORDER — METRONIDAZOLE 500 MG/1
500 TABLET ORAL EVERY 12 HOURS
Qty: 14 TABLET | Refills: 0 | Status: SHIPPED | OUTPATIENT
Start: 2023-01-17 | End: 2023-01-24

## 2023-01-26 ENCOUNTER — PATIENT MESSAGE (OUTPATIENT)
Dept: OTHER | Facility: OTHER | Age: 28
End: 2023-01-26
Payer: MEDICAID

## 2023-01-27 DIAGNOSIS — O21.9 NAUSEA/VOMITING IN PREGNANCY: ICD-10-CM

## 2023-02-02 ENCOUNTER — PATIENT MESSAGE (OUTPATIENT)
Dept: OTHER | Facility: OTHER | Age: 28
End: 2023-02-02
Payer: MEDICAID

## 2023-02-18 ENCOUNTER — PATIENT MESSAGE (OUTPATIENT)
Dept: OBSTETRICS AND GYNECOLOGY | Facility: CLINIC | Age: 28
End: 2023-02-18
Payer: MEDICAID

## 2023-02-23 ENCOUNTER — PATIENT MESSAGE (OUTPATIENT)
Dept: OTHER | Facility: OTHER | Age: 28
End: 2023-02-23
Payer: MEDICAID

## 2023-02-24 ENCOUNTER — PROCEDURE VISIT (OUTPATIENT)
Dept: OBSTETRICS AND GYNECOLOGY | Facility: CLINIC | Age: 28
End: 2023-02-24
Payer: MEDICAID

## 2023-02-24 ENCOUNTER — ROUTINE PRENATAL (OUTPATIENT)
Dept: OBSTETRICS AND GYNECOLOGY | Facility: CLINIC | Age: 28
End: 2023-02-24
Payer: MEDICAID

## 2023-02-24 VITALS
DIASTOLIC BLOOD PRESSURE: 72 MMHG | SYSTOLIC BLOOD PRESSURE: 114 MMHG | BODY MASS INDEX: 29.56 KG/M2 | WEIGHT: 161.63 LBS

## 2023-02-24 DIAGNOSIS — Z3A.20 20 WEEKS GESTATION OF PREGNANCY: Primary | ICD-10-CM

## 2023-02-24 DIAGNOSIS — Z36.89 ENCOUNTER FOR FETAL ANATOMIC SURVEY: ICD-10-CM

## 2023-02-24 PROCEDURE — 99213 PR OFFICE/OUTPT VISIT, EST, LEVL III, 20-29 MIN: ICD-10-PCS | Mod: TH,S$PBB,,

## 2023-02-24 PROCEDURE — 99999 PR PBB SHADOW E&M-EST. PATIENT-LVL II: CPT | Mod: PBBFAC,,,

## 2023-02-24 PROCEDURE — 99213 OFFICE O/P EST LOW 20 MIN: CPT | Mod: TH,S$PBB,,

## 2023-02-24 PROCEDURE — 99212 OFFICE O/P EST SF 10 MIN: CPT | Mod: PBBFAC,25

## 2023-02-24 PROCEDURE — 99999 PR PBB SHADOW E&M-EST. PATIENT-LVL II: ICD-10-PCS | Mod: PBBFAC,,,

## 2023-02-24 PROCEDURE — 76805 US OB/GYN PROCEDURE (VIEWPOINT): ICD-10-PCS | Mod: 26,S$PBB,, | Performed by: OBSTETRICS & GYNECOLOGY

## 2023-02-24 PROCEDURE — 76805 OB US >/= 14 WKS SNGL FETUS: CPT | Mod: PBBFAC | Performed by: OBSTETRICS & GYNECOLOGY

## 2023-02-24 NOTE — PROGRESS NOTES
27 y.o. female  at 20w1d   is feeling flutters /FM, denies VB, LOF or cramping  Doing well without concerns. Reports appetite is getting better starting to gain weight.   TWG: -1 lbs   Reviewed anatomy US: , vertex, posterior placenta, 3VC, SLIM WNL, MVP 4.2cm, EFW 370g (13oz), 33%ile, female, CL 35mm, anatomy complete  Genetic testing results reviewed     Reviewed warning signs, normal FM,  labor precautions and how/when to call. Patient states understanding.  RTC x 4 wks, call or present sooner prn.     I spent a total of 20 minutes on the day of the visit.This includes face to face time and non-face to face time preparing to see the patient (eg, review of tests), Obtaining and/or reviewing separately obtained history, Documenting clinical information in the electronic or other health record, Independently interpreting resultsand communicating results to the patient/family/caregiver, or Care coordination.

## 2023-03-21 ENCOUNTER — HOSPITAL ENCOUNTER (OUTPATIENT)
Facility: HOSPITAL | Age: 28
Discharge: HOME | End: 2023-03-21
Attending: OBSTETRICS & GYNECOLOGY | Admitting: OBSTETRICS & GYNECOLOGY
Payer: MEDICAID

## 2023-03-21 DIAGNOSIS — Z34.90 PREGNANCY: ICD-10-CM

## 2023-03-21 DIAGNOSIS — N76.0 BV (BACTERIAL VAGINOSIS): Primary | ICD-10-CM

## 2023-03-21 DIAGNOSIS — B96.89 BV (BACTERIAL VAGINOSIS): Primary | ICD-10-CM

## 2023-03-21 PROCEDURE — 99214 PR OFFICE/OUTPT VISIT, EST, LEVL IV, 30-39 MIN: ICD-10-PCS | Mod: TH,,, | Performed by: MIDWIFE

## 2023-03-21 PROCEDURE — 99214 OFFICE O/P EST MOD 30 MIN: CPT | Mod: TH,,, | Performed by: MIDWIFE

## 2023-03-21 PROCEDURE — G0378 HOSPITAL OBSERVATION PER HR: HCPCS

## 2023-03-21 PROCEDURE — 99211 OFF/OP EST MAY X REQ PHY/QHP: CPT

## 2023-03-21 RX ORDER — METRONIDAZOLE 500 MG/1
500 TABLET ORAL EVERY 12 HOURS
Qty: 14 TABLET | Refills: 0 | Status: SHIPPED | OUTPATIENT
Start: 2023-03-21 | End: 2023-03-28

## 2023-03-22 NOTE — SUBJECTIVE & OBJECTIVE
Obstetric HPI:  Patient reports active fetal movement, No vaginal bleeding , No loss of fluid     This pregnancy has been complicated by chlamydia, persistent BV, THC use    OB History    Para Term  AB Living   3 2 2 0 0 2   SAB IAB Ectopic Multiple Live Births   0 0 0 0 2      # Outcome Date GA Lbr Isidro/2nd Weight Sex Delivery Anes PTL Lv   3 Current            2 Term 16 38w0d  2.523 kg (5 lb 9 oz) F Vag-Spont EPI  JOSE   1 Term 09/20/15 38w0d  2.523 kg (5 lb 9 oz) F Vag-Spont EPI  JOSE     Past Medical History:   Diagnosis Date    Chlamydia     Major depressive disorder without psychotic features 2018    Post traumatic stress disorder (PTSD) 2018     Past Surgical History:   Procedure Laterality Date    TONSILLECTOMY         PTA Medications   Medication Sig    ondansetron (ZOFRAN) 4 MG tablet Take 1 tablet (4 mg total) by mouth every 6 (six) hours as needed for Nausea.    promethazine (PHENERGAN) 25 MG tablet Take 1 tablet (25 mg total) by mouth every 4 (four) hours as needed for Nausea.       Review of patient's allergies indicates:  No Known Allergies     Family History       Problem Relation (Age of Onset)    Diabetes Maternal Aunt    Hypertension Mother    Lung cancer Maternal Grandmother          Tobacco Use    Smoking status: Former    Smokeless tobacco: Never    Tobacco comments:     1/3 pack/day   Substance and Sexual Activity    Alcohol use: Not Currently    Drug use: Not Currently     Types: Marijuana, Hydrocodone     Comment: Marijuana and Norco Daily    Sexual activity: Yes     Partners: Male     Comment: [employer]     Review of Systems   Objective:     Vital Signs (Most Recent):    Vital Signs (24h Range):  Temp:  [98 °F (36.7 °C)] 98 °F (36.7 °C)  Pulse:  [85] 85  Resp:  [18] 18  SpO2:  [96 %] 96 %  BP: (101)/(53) 101/53        There is no height or weight on file to calculate BMI.    FHT: present  TOCO:  none    Physical Exam    Cervix:  Dilation:  deferred  Effacement:     Station:   Presentation:      Significant Labs:  Lab Results   Component Value Date    GROUPTRH O POS 11/11/2022    HEPBSAG Non-reactive 11/11/2022       I have personallly reviewed all pertinent lab results from the last 24 hours.  Recent Lab Results       None

## 2023-03-22 NOTE — DISCHARGE INSTRUCTIONS
Discharge Instructions    Self Care Instructions:    Diet:  Eat from the five basic food groups  Fruits and proteins are good choices  Limit fast foods and added salt/sugar  Moderate carbonated and caffeine drinks    Hydration:  Drink at least 8 large glasses of water a day    Kick Counts:  After a meal, rest on your side and note the baby's movements until you have 8-10 movements in a 2 hour counting period.    If you do not feel your baby move 8-10 times within 2 hours or you sense a change in the type or character of the baby's movement, you should come in to the hospital at once.  Remember; your baby can sleep for 20-40 minutes at a time.      When to notify your provider:    Vaginal bleeding like a period;  You may spot if we examined your cervix.  If your water breaks, come to the birth center.  Note time, color and odor.  Abdominal tenderness or pain that does not go away  Contractions every 3 to 5 minutes for 1 to 2 hours.  True contractions move from front to back, are regular; usually get longer, stronger and closer together and do not stop if you change your position or activity.  Any burning, urgency or frequency in relation to emptying your bladder.  Any temperature greater than 100.4 degrees, chills, flu-like symptoms      [place] Pregnancy And Lactation Text: There is insufficient data to evaluate drug-associated risk for major birth defects, miscarriage, or other adverse maternal or fetal outcomes.  There is a pregnancy registry that monitors pregnancy outcomes in pregnant persons exposed to the medication during pregnancy.  It is unknown if this medication is excreted in breast milk.  Do not breastfeed during treatment and for about 4 weeks after the last dose.

## 2023-03-22 NOTE — DISCHARGE SUMMARY
[facility]  Obstetrics  Discharge Summary      Patient Name: Luisa Callaway  MRN: [id number]  Admission Date: 3/21/2023  Hospital Length of Stay: 0 days  Discharge Date and Time: 3/21/23  Attending Physician: Viridiana Guardado MD   Discharging Provider: Finesse Smith CNM   Primary Care Provider: Primary Doctor Pao    HPI: 26 yo, , 23w 5d, presents to unit by EMS from [facility], she was diagnosed w/ BV, given flagyl and vomited, pt has been having BV the whole pregnancy, has been using essential oils, scented perfumes in her bath water      FHT: present  TOCO:  none    * No surgery found *     Hospital Course:   Observe   FHT's  Rx for flagyl  Stop using essential oils and perfumes  D/C home           Final Active Diagnoses:    Diagnosis Date Noted POA    PRINCIPAL PROBLEM:  BV (bacterial vaginosis) [N76.0, B96.89] 2022 Yes      Problems Resolved During this Admission:        Significant Diagnostic Studies: Labs: All labs within the past 24 hours have been reviewed    Immunizations     None          This patient has no babies on file.  Pending Diagnostic Studies:     None          Discharged Condition: stable    Disposition: Home or Self Care    Follow Up:   Follow-up Information     Quin Cameron CNM Follow up.    Specialty: Obstetrics and Gynecology  Why: as scheduled  Contact information:  [address]  [place] [address] [phone]                       Patient Instructions:      Diet Adult Regular     Diet Adult Regular     Notify your health care provider if you experience any of the following:  temperature >100.4     Notify your health care provider if you experience any of the following:  severe uncontrolled pain     Notify your health care provider if you experience any of the following:  redness, tenderness, or signs of infection (pain, swelling, redness, odor or green/yellow discharge around incision site)     Notify your health care provider if you experience any of the  following:  severe persistent headache     Notify your health care provider if you experience any of the following:  increased confusion or weakness     Activity as tolerated     Medications:  Current Discharge Medication List      START taking these medications    Details   metroNIDAZOLE (FLAGYL) 500 MG tablet Take 1 tablet (500 mg total) by mouth every 12 (twelve) hours. for 7 days  Qty: 14 tablet, Refills: 0         CONTINUE these medications which have NOT CHANGED    Details   ondansetron (ZOFRAN) 4 MG tablet Take 1 tablet (4 mg total) by mouth every 6 (six) hours as needed for Nausea.  Qty: 60 tablet, Refills: 3    Associated Diagnoses: Nausea/vomiting in pregnancy      promethazine (PHENERGAN) 25 MG tablet Take 1 tablet (25 mg total) by mouth every 4 (four) hours as needed for Nausea.  Qty: 60 tablet, Refills: 0    Associated Diagnoses: Nausea/vomiting in pregnancy             Finesse Smith CNM  Obstetrics  [facility]

## 2023-03-22 NOTE — HPI
26 yo, , 23w 5d, presents to unit by EMS from [facility], she was diagnosed w/ BV, given flagyl and vomited, pt has been having BV the whole pregnancy, has been using essential oils, scented perfumes in her bath water

## 2023-03-22 NOTE — H&P
[facility]  Obstetrics  History & Physical    Patient Name: Luisa Callaway  MRN: [id number]  Admission Date: 3/21/2023  Primary Care Provider: Primary Doctor No    Subjective:     Principal Problem:BV (bacterial vaginosis)    History of Present Illness:  26 yo, , 23w 5d, presents to unit by EMS from [facility], she was diagnosed w/ BV, given flagyl and vomited, pt has been having BV the whole pregnancy, has been using essential oils, scented perfumes in her bath water      Obstetric HPI:  Patient reports active fetal movement, No vaginal bleeding , No loss of fluid     This pregnancy has been complicated by chlamydia, persistent BV, THC use    OB History    Para Term  AB Living   3 2 2 0 0 2   SAB IAB Ectopic Multiple Live Births   0 0 0 0 2      # Outcome Date GA Lbr Isidro/2nd Weight Sex Delivery Anes PTL Lv   3 Current            2 Term 16 38w0d  2.523 kg (5 lb 9 oz) F Vag-Spont EPI  JOSE   1 Term 09/20/15 38w0d  2.523 kg (5 lb 9 oz) F Vag-Spont EPI  JOSE     Past Medical History:   Diagnosis Date    Chlamydia     Major depressive disorder without psychotic features 2018    Post traumatic stress disorder (PTSD) 2018     Past Surgical History:   Procedure Laterality Date    TONSILLECTOMY         PTA Medications   Medication Sig    ondansetron (ZOFRAN) 4 MG tablet Take 1 tablet (4 mg total) by mouth every 6 (six) hours as needed for Nausea.    promethazine (PHENERGAN) 25 MG tablet Take 1 tablet (25 mg total) by mouth every 4 (four) hours as needed for Nausea.       Review of patient's allergies indicates:  No Known Allergies     Family History       Problem Relation (Age of Onset)    Diabetes Maternal Aunt    Hypertension Mother    Lung cancer Maternal Grandmother          Tobacco Use    Smoking status: Former    Smokeless tobacco: Never    Tobacco comments:     1/3 pack/day   Substance and Sexual Activity    Alcohol use: Not Currently    Drug use: Not Currently      Types: Marijuana, Hydrocodone     Comment: Marijuana and Norco Daily    Sexual activity: Yes     Partners: Male     Comment: ParaGard     Review of Systems   Objective:     Vital Signs (Most Recent):    Vital Signs (24h Range):  Temp:  [98 °F (36.7 °C)] 98 °F (36.7 °C)  Pulse:  [85] 85  Resp:  [18] 18  SpO2:  [96 %] 96 %  BP: (101)/(53) 101/53        There is no height or weight on file to calculate BMI.    FHT: present  TOCO:  none    Physical Exam    Cervix:  Dilation:  deferred  Effacement:    Station:   Presentation:      Significant Labs:  Lab Results   Component Value Date    GROUPTRH O POS 2022    HEPBSAG Non-reactive 2022       I have personallly reviewed all pertinent lab results from the last 24 hours.  Recent Lab Results       None          Assessment/Plan:     27 y.o. female  at 23w5d for:    * BV (bacterial vaginosis)  Observe   FHT's  Rx for flagyl  Stop using essential oils and perfumes  D/C home        Finesse Smith CNM  Obstetrics  [facility]

## 2023-03-23 ENCOUNTER — PATIENT MESSAGE (OUTPATIENT)
Dept: OTHER | Facility: OTHER | Age: 28
End: 2023-03-23
Payer: MEDICAID

## 2023-03-27 RX ORDER — ONDANSETRON 4 MG/1
4 TABLET, FILM COATED ORAL EVERY 6 HOURS PRN
Qty: 60 TABLET | Refills: 3 | OUTPATIENT
Start: 2023-03-27

## 2023-03-28 ENCOUNTER — PATIENT MESSAGE (OUTPATIENT)
Dept: OBSTETRICS AND GYNECOLOGY | Facility: CLINIC | Age: 28
End: 2023-03-28
Payer: MEDICAID

## 2023-03-30 ENCOUNTER — ROUTINE PRENATAL (OUTPATIENT)
Dept: OBSTETRICS AND GYNECOLOGY | Facility: CLINIC | Age: 28
End: 2023-03-30
Payer: MEDICAID

## 2023-03-30 VITALS
WEIGHT: 166.69 LBS | BODY MASS INDEX: 30.48 KG/M2 | DIASTOLIC BLOOD PRESSURE: 56 MMHG | SYSTOLIC BLOOD PRESSURE: 102 MMHG

## 2023-03-30 DIAGNOSIS — O26.892 VAGINAL DISCHARGE DURING PREGNANCY IN SECOND TRIMESTER: ICD-10-CM

## 2023-03-30 DIAGNOSIS — N89.8 VAGINAL DISCHARGE DURING PREGNANCY IN SECOND TRIMESTER: ICD-10-CM

## 2023-03-30 DIAGNOSIS — O99.891 BACK PAIN AFFECTING PREGNANCY IN SECOND TRIMESTER: ICD-10-CM

## 2023-03-30 DIAGNOSIS — Z34.82 ENCOUNTER FOR SUPERVISION OF OTHER NORMAL PREGNANCY IN SECOND TRIMESTER: ICD-10-CM

## 2023-03-30 DIAGNOSIS — Z3A.25 25 WEEKS GESTATION OF PREGNANCY: Primary | ICD-10-CM

## 2023-03-30 DIAGNOSIS — M54.9 BACK PAIN AFFECTING PREGNANCY IN SECOND TRIMESTER: ICD-10-CM

## 2023-03-30 LAB
BACTERIA #/AREA URNS HPF: NORMAL /HPF
BILIRUB UR QL STRIP: NEGATIVE
CLARITY UR: CLEAR
COLOR UR: YELLOW
GLUCOSE UR QL STRIP: NEGATIVE
HGB UR QL STRIP: NEGATIVE
KETONES UR QL STRIP: NEGATIVE
LEUKOCYTE ESTERASE UR QL STRIP: ABNORMAL
MICROSCOPIC COMMENT: NORMAL
NITRITE UR QL STRIP: NEGATIVE
PH UR STRIP: 8 [PH] (ref 5–8)
PROT UR QL STRIP: NEGATIVE
RBC #/AREA URNS HPF: 0 /HPF (ref 0–4)
SP GR UR STRIP: 1.01 (ref 1–1.03)
SQUAMOUS #/AREA URNS HPF: 6 /HPF
URN SPEC COLLECT METH UR: ABNORMAL
WBC #/AREA URNS HPF: 1 /HPF (ref 0–5)

## 2023-03-30 PROCEDURE — 99214 PR OFFICE/OUTPT VISIT, EST, LEVL IV, 30-39 MIN: ICD-10-PCS | Mod: TH,S$PBB,,

## 2023-03-30 PROCEDURE — 87591 N.GONORRHOEAE DNA AMP PROB: CPT

## 2023-03-30 PROCEDURE — 99212 OFFICE O/P EST SF 10 MIN: CPT | Mod: PBBFAC,TH

## 2023-03-30 PROCEDURE — 99999 PR PBB SHADOW E&M-EST. PATIENT-LVL II: ICD-10-PCS | Mod: PBBFAC,,,

## 2023-03-30 PROCEDURE — 87086 URINE CULTURE/COLONY COUNT: CPT

## 2023-03-30 PROCEDURE — 81000 URINALYSIS NONAUTO W/SCOPE: CPT

## 2023-03-30 PROCEDURE — 99214 OFFICE O/P EST MOD 30 MIN: CPT | Mod: TH,S$PBB,,

## 2023-03-30 PROCEDURE — 99999 PR PBB SHADOW E&M-EST. PATIENT-LVL II: CPT | Mod: PBBFAC,,,

## 2023-03-30 PROCEDURE — 81514 NFCT DS BV&VAGINITIS DNA ALG: CPT

## 2023-03-30 NOTE — PROGRESS NOTES
27 y.o. female  at 25w0d   Reports + FM, denies VB, LOF, or cramping  Doing ok. C/o body aches, off and on fever, general malaise, and congestion.  Is going today to get tested for flu/covid/strep. Advise tylenol, plain mucinex, plenty of fluids and rest. Work excuse given for 3/23 and 3/24. Placed in media.   Reports still having green discharge and now having back and pelvic pain.  Vagina appears WNL. scant amount of non odorous white discharge noted during exam.  UA, GC/CT, and affirm done.   TWG: 3 lbs   Breast pump Rx: given    Reviewed upcoming 28wk labs, (O POS) and orders placed, tdap handout provided and explained    25 weeks gestation of pregnancy  - routine PNC  Encounter for supervision of other normal pregnancy in second trimester  -     OB Glucose Screen; Future; Expected date: 2023  -     CBC auto differential; Future; Expected date: 2023  -     RPR; Future; Expected date: 2023  -     HIV 1/2 Ag/Ab (4th Gen); Future; Expected date: 2023  -     HERPES SIMPLEX 1&2 IGG; Future; Expected date: 2023  -     HERPES SIMPLEX 1 & 2 IGM; Future; Expected date: 2023    Vaginal discharge during pregnancy in second trimester  -     VAGINOSIS SCREEN BY DNA PROBE  -     C. trachomatis/N. gonorrhoeae by AMP DNA [facility]; Vagina    Back pain affecting pregnancy in second trimester  -     VAGINOSIS SCREEN BY DNA PROBE  -     C. trachomatis/N. gonorrhoeae by AMP DNA [facility]; Vagina  -     Urinalysis, Reflex to Urine Culture Urine, Clean Catch  -     Urine Culture High Risk    Lactating mother  -     BREAST PUMP FOR HOME USE        Reviewed warning signs, normal FM,  labor precautions and how/when to call. Patient states understanding.  RTC x 3 wks, call or present sooner prn.

## 2023-03-31 LAB
BACTERIAL VAGINOSIS DNA: NEGATIVE
CANDIDA GLABRATA DNA: NEGATIVE
CANDIDA KRUSEI DNA: NEGATIVE
CANDIDA RRNA VAG QL PROBE: POSITIVE
T VAGINALIS RRNA GENITAL QL PROBE: NEGATIVE

## 2023-04-01 LAB — BACTERIA UR CULT: NO GROWTH

## 2023-04-06 ENCOUNTER — PATIENT MESSAGE (OUTPATIENT)
Dept: OTHER | Facility: OTHER | Age: 28
End: 2023-04-06
Payer: MEDICAID

## 2023-04-09 LAB
C TRACH DNA SPEC QL NAA+PROBE: NOT DETECTED
N GONORRHOEA DNA SPEC QL NAA+PROBE: NOT DETECTED

## 2023-04-20 ENCOUNTER — PATIENT MESSAGE (OUTPATIENT)
Dept: OTHER | Facility: OTHER | Age: 28
End: 2023-04-20
Payer: MEDICAID

## 2023-04-21 ENCOUNTER — ROUTINE PRENATAL (OUTPATIENT)
Dept: OBSTETRICS AND GYNECOLOGY | Facility: CLINIC | Age: 28
End: 2023-04-21
Payer: MEDICAID

## 2023-04-21 ENCOUNTER — LAB VISIT (OUTPATIENT)
Dept: LAB | Facility: HOSPITAL | Age: 28
End: 2023-04-21
Payer: MEDICAID

## 2023-04-21 VITALS
DIASTOLIC BLOOD PRESSURE: 58 MMHG | SYSTOLIC BLOOD PRESSURE: 110 MMHG | WEIGHT: 178.13 LBS | BODY MASS INDEX: 32.58 KG/M2

## 2023-04-21 DIAGNOSIS — B37.31 VAGINAL YEAST INFECTION: ICD-10-CM

## 2023-04-21 DIAGNOSIS — Z3A.28 28 WEEKS GESTATION OF PREGNANCY: Primary | ICD-10-CM

## 2023-04-21 DIAGNOSIS — Z34.82 ENCOUNTER FOR SUPERVISION OF OTHER NORMAL PREGNANCY IN SECOND TRIMESTER: ICD-10-CM

## 2023-04-21 LAB
BASOPHILS # BLD AUTO: 0.07 K/UL (ref 0–0.2)
BASOPHILS NFR BLD: 0.7 % (ref 0–1.9)
DIFFERENTIAL METHOD: ABNORMAL
EOSINOPHIL # BLD AUTO: 0.2 K/UL (ref 0–0.5)
EOSINOPHIL NFR BLD: 2.1 % (ref 0–8)
ERYTHROCYTE [DISTWIDTH] IN BLOOD BY AUTOMATED COUNT: 12.5 % (ref 11.5–14.5)
GLUCOSE SERPL-MCNC: 56 MG/DL (ref 70–140)
HCT VFR BLD AUTO: 35.7 % (ref 37–48.5)
HGB BLD-MCNC: 11.6 G/DL (ref 12–16)
HIV 1+2 AB+HIV1 P24 AG SERPL QL IA: NORMAL
IMM GRANULOCYTES # BLD AUTO: 0.19 K/UL (ref 0–0.04)
IMM GRANULOCYTES NFR BLD AUTO: 1.9 % (ref 0–0.5)
LYMPHOCYTES # BLD AUTO: 2.1 K/UL (ref 1–4.8)
LYMPHOCYTES NFR BLD: 20.8 % (ref 18–48)
MCH RBC QN AUTO: 33.4 PG (ref 27–31)
MCHC RBC AUTO-ENTMCNC: 32.5 G/DL (ref 32–36)
MCV RBC AUTO: 103 FL (ref 82–98)
MONOCYTES # BLD AUTO: 0.6 K/UL (ref 0.3–1)
MONOCYTES NFR BLD: 6.4 % (ref 4–15)
NEUTROPHILS # BLD AUTO: 6.8 K/UL (ref 1.8–7.7)
NEUTROPHILS NFR BLD: 68.1 % (ref 38–73)
NRBC BLD-RTO: 0 /100 WBC
PLATELET # BLD AUTO: 186 K/UL (ref 150–450)
PMV BLD AUTO: 13 FL (ref 9.2–12.9)
RBC # BLD AUTO: 3.47 M/UL (ref 4–5.4)
WBC # BLD AUTO: 9.94 K/UL (ref 3.9–12.7)

## 2023-04-21 PROCEDURE — 85025 COMPLETE CBC W/AUTO DIFF WBC: CPT

## 2023-04-21 PROCEDURE — 99213 PR OFFICE/OUTPT VISIT, EST, LEVL III, 20-29 MIN: ICD-10-PCS | Mod: TH,S$PBB,,

## 2023-04-21 PROCEDURE — 99212 OFFICE O/P EST SF 10 MIN: CPT | Mod: PBBFAC

## 2023-04-21 PROCEDURE — 82950 GLUCOSE TEST: CPT

## 2023-04-21 PROCEDURE — 99999 PR PBB SHADOW E&M-EST. PATIENT-LVL II: CPT | Mod: PBBFAC,,,

## 2023-04-21 PROCEDURE — 86592 SYPHILIS TEST NON-TREP QUAL: CPT

## 2023-04-21 PROCEDURE — 36415 COLL VENOUS BLD VENIPUNCTURE: CPT

## 2023-04-21 PROCEDURE — 86695 HERPES SIMPLEX TYPE 1 TEST: CPT

## 2023-04-21 PROCEDURE — 99213 OFFICE O/P EST LOW 20 MIN: CPT | Mod: TH,S$PBB,,

## 2023-04-21 PROCEDURE — 86694 HERPES SIMPLEX NES ANTBDY: CPT

## 2023-04-21 PROCEDURE — 87389 HIV-1 AG W/HIV-1&-2 AB AG IA: CPT

## 2023-04-21 PROCEDURE — 99999 PR PBB SHADOW E&M-EST. PATIENT-LVL II: ICD-10-PCS | Mod: PBBFAC,,,

## 2023-04-21 RX ORDER — FLUCONAZOLE 150 MG/1
150 TABLET ORAL DAILY
Qty: 1 TABLET | Refills: 0 | Status: SHIPPED | OUTPATIENT
Start: 2023-04-21 | End: 2023-04-22

## 2023-04-22 LAB — RPR SER QL: NORMAL

## 2023-04-24 PROBLEM — R76.8 HSV-2 SEROPOSITIVE: Status: ACTIVE | Noted: 2023-04-24

## 2023-04-24 LAB
HSV1 IGG SERPL QL IA: POSITIVE
HSV2 IGG SERPL QL IA: POSITIVE

## 2023-04-25 ENCOUNTER — HOSPITAL ENCOUNTER (OUTPATIENT)
Facility: HOSPITAL | Age: 28
Discharge: HOME | End: 2023-04-25
Attending: OBSTETRICS & GYNECOLOGY | Admitting: OBSTETRICS & GYNECOLOGY
Payer: MEDICAID

## 2023-04-25 VITALS — SYSTOLIC BLOOD PRESSURE: 115 MMHG | HEART RATE: 66 BPM | OXYGEN SATURATION: 100 % | DIASTOLIC BLOOD PRESSURE: 53 MMHG

## 2023-04-25 DIAGNOSIS — T74.91XA DOMESTIC ABUSE OF ADULT: ICD-10-CM

## 2023-04-25 DIAGNOSIS — O36.8130 DECREASED FETAL MOVEMENT AFFECTING MANAGEMENT OF PREGNANCY IN THIRD TRIMESTER, SINGLE OR UNSPECIFIED FETUS: Primary | ICD-10-CM

## 2023-04-25 PROCEDURE — 59025 OBTAIN FETAL NONSTRESS TEST (NST): ICD-10-PCS | Mod: 26,,, | Performed by: ADVANCED PRACTICE MIDWIFE

## 2023-04-25 PROCEDURE — 59025 FETAL NON-STRESS TEST: CPT | Mod: 26,,, | Performed by: ADVANCED PRACTICE MIDWIFE

## 2023-04-25 PROCEDURE — 99214 OFFICE O/P EST MOD 30 MIN: CPT | Mod: 25,TH,, | Performed by: ADVANCED PRACTICE MIDWIFE

## 2023-04-25 PROCEDURE — 99211 OFF/OP EST MAY X REQ PHY/QHP: CPT | Mod: 25

## 2023-04-25 PROCEDURE — 99214 PR OFFICE/OUTPT VISIT, EST, LEVL IV, 30-39 MIN: ICD-10-PCS | Mod: 25,TH,, | Performed by: ADVANCED PRACTICE MIDWIFE

## 2023-04-25 PROCEDURE — 59025 FETAL NON-STRESS TEST: CPT | Mod: 59

## 2023-04-25 RX ORDER — ONDANSETRON 8 MG/1
8 TABLET, ORALLY DISINTEGRATING ORAL EVERY 8 HOURS PRN
Status: DISCONTINUED | OUTPATIENT
Start: 2023-04-25 | End: 2023-04-25 | Stop reason: HOSPADM

## 2023-04-25 RX ORDER — ACETAMINOPHEN 500 MG
500 TABLET ORAL EVERY 6 HOURS PRN
Status: DISCONTINUED | OUTPATIENT
Start: 2023-04-25 | End: 2023-04-25 | Stop reason: HOSPADM

## 2023-04-25 RX ORDER — PROCHLORPERAZINE EDISYLATE 5 MG/ML
5 INJECTION INTRAMUSCULAR; INTRAVENOUS EVERY 6 HOURS PRN
Status: DISCONTINUED | OUTPATIENT
Start: 2023-04-25 | End: 2023-04-25 | Stop reason: HOSPADM

## 2023-04-25 NOTE — PROCEDURES
Luisa Callaway is a 27 y.o. female patient.            Obtain Fetal nonstress test (NST)    Date/Time: 2023 2:49 PM  Performed by: Yolande Adamson CNM  Authorized by: Yolande Adamson CNM     Nonstress Test:     Variability:  6-25 BPM    Decelerations:  None    Accelerations:  15 bpm    Baseline:  140    Contraction Frequency:  Rare  Biophysical Profile:     Fetal Breathin    Fetal Movement:  2    Fetal Tone:  2    Fluid Volume:  2    Nonstress Test:  2    Biophysical Profile Score  (of 8):  8    Biophysical Profile Score  (of 10):  10    SLIM (if indicated) (cm):  14    MVP (Maximal Vertical Pocket):  4.8    Nonstress Test Interpretation: reactive      Overall Impression:  Reassuring  Post-procedure:     Patient tolerance:  Patient tolerated the procedure well with no immediate complications    2023

## 2023-04-25 NOTE — PROGRESS NOTES
27 y.o. female  at 28w1d   Reports + FM, denies VB, LOF or CTX  Doing well. Still having some discharge and vaginal irritation from yeast infection.  3 day course of diflucan sent to pharmacy.  TW lbs   28wk labs today (O POS), patient also requesting HSV blood work today  Tdap NV    28 weeks gestation of pregnancy    Vaginal yeast infection  -     fluconazole (DIFLUCAN) 150 MG Tab; Take 1 tablet (150 mg total) by mouth once daily. for 1 day  Dispense: 1 tablet; Refill: 0        Reviewed warning signs, normal FKCs,  labor precautions and how/when to call. Patient states understanding  RTC x 2 wks, call or present sooner prn.
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
## 2023-04-25 NOTE — ASSESSMENT & PLAN NOTE
NST reactive and reassuring  BPP 8/8 with normal MVP 4.8cm SLIM 14.5cm  Cervix visualized C/T/H with no bleeding  O positive blood type
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
## 2023-04-25 NOTE — HOSPITAL COURSE
NST reactive, rare contractions noted  BPP 8/8 reassuring with normal MVP, cephalic  SANE nurse and STAR advocate consulted and they did evaluations of patient  [employer] notified and called to get her statement   consulted
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
## 2023-04-26 LAB — HSV AB, IGM BY EIA: 1.15 INDEX

## 2023-05-04 ENCOUNTER — PATIENT MESSAGE (OUTPATIENT)
Dept: OTHER | Facility: OTHER | Age: 28
End: 2023-05-04
Payer: MEDICAID

## 2023-05-08 ENCOUNTER — ROUTINE PRENATAL (OUTPATIENT)
Dept: OBSTETRICS AND GYNECOLOGY | Facility: CLINIC | Age: 28
End: 2023-05-08
Payer: MEDICAID

## 2023-05-08 VITALS
SYSTOLIC BLOOD PRESSURE: 103 MMHG | BODY MASS INDEX: 31.61 KG/M2 | DIASTOLIC BLOOD PRESSURE: 66 MMHG | WEIGHT: 172.81 LBS

## 2023-05-08 DIAGNOSIS — Z3A.30 30 WEEKS GESTATION OF PREGNANCY: Primary | ICD-10-CM

## 2023-05-08 DIAGNOSIS — T74.91XD DOMESTIC VIOLENCE OF ADULT, SUBSEQUENT ENCOUNTER: ICD-10-CM

## 2023-05-08 DIAGNOSIS — Z23 ENCOUNTER FOR VACCINATION: ICD-10-CM

## 2023-05-08 PROCEDURE — 99999 PR PBB SHADOW E&M-EST. PATIENT-LVL II: CPT | Mod: PBBFAC,,,

## 2023-05-08 PROCEDURE — 99212 OFFICE O/P EST SF 10 MIN: CPT | Mod: PBBFAC

## 2023-05-08 PROCEDURE — 99999 PR PBB SHADOW E&M-EST. PATIENT-LVL II: ICD-10-PCS | Mod: PBBFAC,,,

## 2023-05-08 PROCEDURE — 90715 TDAP VACCINE 7 YRS/> IM: CPT | Mod: PBBFAC

## 2023-05-08 PROCEDURE — 99214 PR OFFICE/OUTPT VISIT, EST, LEVL IV, 30-39 MIN: ICD-10-PCS | Mod: TH,S$PBB,,

## 2023-05-08 PROCEDURE — 99214 OFFICE O/P EST MOD 30 MIN: CPT | Mod: TH,S$PBB,,

## 2023-05-08 PROCEDURE — 90471 IMMUNIZATION ADMIN: CPT | Mod: PBBFAC

## 2023-05-08 NOTE — PROGRESS NOTES
Patient is here for encounter for T-Dap    Verified patient with 2 patient identifiers. Allergies and medications reviewed.   T-Dap 0.5 mg/ml given IM to left deltoid using aseptic technique.   No discomfort noted. Patient tolerated well.       Patient advised to wait 15 minutes in lobby to monitor for reaction.   Patient verbalized understanding.
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
## 2023-05-18 ENCOUNTER — PATIENT MESSAGE (OUTPATIENT)
Dept: OTHER | Facility: OTHER | Age: 28
End: 2023-05-18
Payer: MEDICAID

## 2023-05-23 ENCOUNTER — ROUTINE PRENATAL (OUTPATIENT)
Dept: OBSTETRICS AND GYNECOLOGY | Facility: CLINIC | Age: 28
End: 2023-05-23
Payer: MEDICAID

## 2023-05-23 VITALS
BODY MASS INDEX: 32.06 KG/M2 | SYSTOLIC BLOOD PRESSURE: 131 MMHG | DIASTOLIC BLOOD PRESSURE: 74 MMHG | WEIGHT: 175.25 LBS

## 2023-05-23 DIAGNOSIS — R76.8 HSV-2 SEROPOSITIVE: ICD-10-CM

## 2023-05-23 DIAGNOSIS — Z3A.32 32 WEEKS GESTATION OF PREGNANCY: Primary | ICD-10-CM

## 2023-05-23 DIAGNOSIS — F43.10 POST TRAUMATIC STRESS DISORDER (PTSD): ICD-10-CM

## 2023-05-23 DIAGNOSIS — F32.9 MAJOR DEPRESSIVE DISORDER WITHOUT PSYCHOTIC FEATURES: ICD-10-CM

## 2023-05-23 DIAGNOSIS — Z36.89 ENCOUNTER FOR ULTRASOUND TO ASSESS FETAL GROWTH: ICD-10-CM

## 2023-05-23 PROCEDURE — 99213 PR OFFICE/OUTPT VISIT, EST, LEVL III, 20-29 MIN: ICD-10-PCS | Mod: TH,S$PBB,,

## 2023-05-23 PROCEDURE — 99212 OFFICE O/P EST SF 10 MIN: CPT | Mod: PBBFAC,TH

## 2023-05-23 PROCEDURE — 99213 OFFICE O/P EST LOW 20 MIN: CPT | Mod: TH,S$PBB,,

## 2023-05-23 PROCEDURE — 99999 PR PBB SHADOW E&M-EST. PATIENT-LVL II: ICD-10-PCS | Mod: PBBFAC,,,

## 2023-05-23 PROCEDURE — 99999 PR PBB SHADOW E&M-EST. PATIENT-LVL II: CPT | Mod: PBBFAC,,,
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
## 2023-06-08 ENCOUNTER — ROUTINE PRENATAL (OUTPATIENT)
Dept: OBSTETRICS AND GYNECOLOGY | Facility: CLINIC | Age: 28
End: 2023-06-08
Payer: MEDICAID

## 2023-06-08 ENCOUNTER — PATIENT MESSAGE (OUTPATIENT)
Dept: OTHER | Facility: OTHER | Age: 28
End: 2023-06-08
Payer: MEDICAID

## 2023-06-08 VITALS
BODY MASS INDEX: 32.94 KG/M2 | WEIGHT: 180.13 LBS | SYSTOLIC BLOOD PRESSURE: 118 MMHG | DIASTOLIC BLOOD PRESSURE: 78 MMHG

## 2023-06-08 DIAGNOSIS — Z3A.35 35 WEEKS GESTATION OF PREGNANCY: Primary | ICD-10-CM

## 2023-06-08 DIAGNOSIS — R76.8 HSV-2 SEROPOSITIVE: ICD-10-CM

## 2023-06-08 PROBLEM — Z34.83 ENCOUNTER FOR SUPERVISION OF OTHER NORMAL PREGNANCY, THIRD TRIMESTER: Status: ACTIVE | Noted: 2022-11-20

## 2023-06-08 PROBLEM — B96.89 BV (BACTERIAL VAGINOSIS): Status: RESOLVED | Noted: 2022-11-12 | Resolved: 2023-06-08

## 2023-06-08 PROBLEM — N76.0 BV (BACTERIAL VAGINOSIS): Status: RESOLVED | Noted: 2022-11-12 | Resolved: 2023-06-08

## 2023-06-08 PROBLEM — O36.8130 DECREASED FETAL MOVEMENT AFFECTING MANAGEMENT OF PREGNANCY IN THIRD TRIMESTER: Status: RESOLVED | Noted: 2023-04-25 | Resolved: 2023-06-08

## 2023-06-08 PROCEDURE — 99213 PR OFFICE/OUTPT VISIT, EST, LEVL III, 20-29 MIN: ICD-10-PCS | Mod: TH,S$PBB,,

## 2023-06-08 PROCEDURE — 99999 PR PBB SHADOW E&M-EST. PATIENT-LVL I: ICD-10-PCS | Mod: PBBFAC,,,

## 2023-06-08 PROCEDURE — 99213 OFFICE O/P EST LOW 20 MIN: CPT | Mod: TH,S$PBB,,

## 2023-06-08 PROCEDURE — 99211 OFF/OP EST MAY X REQ PHY/QHP: CPT | Mod: PBBFAC

## 2023-06-08 PROCEDURE — 99999 PR PBB SHADOW E&M-EST. PATIENT-LVL I: CPT | Mod: PBBFAC,,,

## 2023-06-08 RX ORDER — VALACYCLOVIR HYDROCHLORIDE 500 MG/1
500 TABLET, FILM COATED ORAL DAILY
Qty: 30 TABLET | Refills: 3 | Status: SHIPPED | OUTPATIENT
Start: 2023-06-08
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
## 2023-06-15 ENCOUNTER — ROUTINE PRENATAL (OUTPATIENT)
Dept: OBSTETRICS AND GYNECOLOGY | Facility: CLINIC | Age: 28
End: 2023-06-15
Payer: MEDICAID

## 2023-06-15 ENCOUNTER — PROCEDURE VISIT (OUTPATIENT)
Dept: OBSTETRICS AND GYNECOLOGY | Facility: CLINIC | Age: 28
End: 2023-06-15
Payer: MEDICAID

## 2023-06-15 VITALS
SYSTOLIC BLOOD PRESSURE: 116 MMHG | WEIGHT: 188.69 LBS | BODY MASS INDEX: 34.52 KG/M2 | DIASTOLIC BLOOD PRESSURE: 72 MMHG

## 2023-06-15 DIAGNOSIS — Z36.89 ENCOUNTER FOR ULTRASOUND TO ASSESS FETAL GROWTH: ICD-10-CM

## 2023-06-15 DIAGNOSIS — F32.9 MAJOR DEPRESSIVE DISORDER WITHOUT PSYCHOTIC FEATURES: ICD-10-CM

## 2023-06-15 DIAGNOSIS — F43.10 POST TRAUMATIC STRESS DISORDER (PTSD): ICD-10-CM

## 2023-06-15 DIAGNOSIS — Z3A.36 36 WEEKS GESTATION OF PREGNANCY: Primary | ICD-10-CM

## 2023-06-15 PROCEDURE — 99999 PR PBB SHADOW E&M-EST. PATIENT-LVL II: ICD-10-PCS | Mod: PBBFAC,,,

## 2023-06-15 PROCEDURE — 99213 PR OFFICE/OUTPT VISIT, EST, LEVL III, 20-29 MIN: ICD-10-PCS | Mod: 25,TH,S$PBB,

## 2023-06-15 PROCEDURE — 99212 OFFICE O/P EST SF 10 MIN: CPT | Mod: PBBFAC

## 2023-06-15 PROCEDURE — 99213 OFFICE O/P EST LOW 20 MIN: CPT | Mod: 25,TH,S$PBB,

## 2023-06-15 PROCEDURE — 76816 US OB/GYN PROCEDURE (VIEWPOINT): ICD-10-PCS | Mod: 26,S$PBB,, | Performed by: OBSTETRICS & GYNECOLOGY

## 2023-06-15 PROCEDURE — 99999 PR PBB SHADOW E&M-EST. PATIENT-LVL II: CPT | Mod: PBBFAC,,,

## 2023-06-15 PROCEDURE — 76816 OB US FOLLOW-UP PER FETUS: CPT | Mod: PBBFAC | Performed by: OBSTETRICS & GYNECOLOGY
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
## 2023-06-20 ENCOUNTER — ROUTINE PRENATAL (OUTPATIENT)
Dept: OBSTETRICS AND GYNECOLOGY | Facility: CLINIC | Age: 28
End: 2023-06-20
Payer: MEDICAID

## 2023-06-20 VITALS — WEIGHT: 188.5 LBS | DIASTOLIC BLOOD PRESSURE: 72 MMHG | BODY MASS INDEX: 34.48 KG/M2 | SYSTOLIC BLOOD PRESSURE: 115 MMHG

## 2023-06-20 DIAGNOSIS — Z3A.36 36 WEEKS GESTATION OF PREGNANCY: ICD-10-CM

## 2023-06-20 DIAGNOSIS — F41.9 ANXIETY: ICD-10-CM

## 2023-06-20 DIAGNOSIS — R76.8 HSV-2 SEROPOSITIVE: Primary | ICD-10-CM

## 2023-06-20 PROCEDURE — 99999 PR PBB SHADOW E&M-EST. PATIENT-LVL II: CPT | Mod: PBBFAC,,,

## 2023-06-20 PROCEDURE — 99214 PR OFFICE/OUTPT VISIT, EST, LEVL IV, 30-39 MIN: ICD-10-PCS | Mod: TH,S$PBB,,

## 2023-06-20 PROCEDURE — 99214 OFFICE O/P EST MOD 30 MIN: CPT | Mod: TH,S$PBB,,

## 2023-06-20 PROCEDURE — 87081 CULTURE SCREEN ONLY: CPT

## 2023-06-20 PROCEDURE — 99999 PR PBB SHADOW E&M-EST. PATIENT-LVL II: ICD-10-PCS | Mod: PBBFAC,,,

## 2023-06-20 PROCEDURE — 99212 OFFICE O/P EST SF 10 MIN: CPT | Mod: PBBFAC

## 2023-06-20 RX ORDER — HYDROXYZINE PAMOATE 25 MG/1
25 CAPSULE ORAL EVERY 4 HOURS PRN
Qty: 60 CAPSULE | Refills: 1 | Status: SHIPPED | OUTPATIENT
Start: 2023-06-20
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
## 2023-06-23 LAB — BACTERIA SPEC AEROBE CULT: NORMAL

## 2023-06-27 ENCOUNTER — ROUTINE PRENATAL (OUTPATIENT)
Dept: OBSTETRICS AND GYNECOLOGY | Facility: CLINIC | Age: 28
End: 2023-06-27
Payer: MEDICAID

## 2023-06-27 VITALS — BODY MASS INDEX: 34.48 KG/M2 | DIASTOLIC BLOOD PRESSURE: 73 MMHG | WEIGHT: 188.5 LBS | SYSTOLIC BLOOD PRESSURE: 110 MMHG

## 2023-06-27 DIAGNOSIS — R76.8 HSV-2 SEROPOSITIVE: ICD-10-CM

## 2023-06-27 DIAGNOSIS — Z3A.37 37 WEEKS GESTATION OF PREGNANCY: Primary | ICD-10-CM

## 2023-06-27 DIAGNOSIS — Z34.90 ENCOUNTER FOR ELECTIVE INDUCTION OF LABOR: ICD-10-CM

## 2023-06-27 PROCEDURE — 99999 PR PBB SHADOW E&M-EST. PATIENT-LVL II: ICD-10-PCS | Mod: PBBFAC,,,

## 2023-06-27 PROCEDURE — 99212 OFFICE O/P EST SF 10 MIN: CPT | Mod: PBBFAC,TH

## 2023-06-27 PROCEDURE — 99213 PR OFFICE/OUTPT VISIT, EST, LEVL III, 20-29 MIN: ICD-10-PCS | Mod: TH,S$PBB,,

## 2023-06-27 PROCEDURE — 99213 OFFICE O/P EST LOW 20 MIN: CPT | Mod: TH,S$PBB,,

## 2023-06-27 PROCEDURE — 99999 PR PBB SHADOW E&M-EST. PATIENT-LVL II: CPT | Mod: PBBFAC,,,

## 2023-06-27 RX ORDER — METHYLERGONOVINE MALEATE 0.2 MG/ML
200 INJECTION INTRAVENOUS
Status: CANCELLED | OUTPATIENT
Start: 2023-06-27

## 2023-06-27 RX ORDER — ONDANSETRON 4 MG/1
8 TABLET, ORALLY DISINTEGRATING ORAL EVERY 8 HOURS PRN
Status: CANCELLED | OUTPATIENT
Start: 2023-06-27

## 2023-06-27 RX ORDER — OXYTOCIN/RINGER'S LACTATE 30/500 ML
0-30 PLASTIC BAG, INJECTION (ML) INTRAVENOUS CONTINUOUS
Status: CANCELLED | OUTPATIENT
Start: 2023-06-27

## 2023-06-27 RX ORDER — CARBOPROST TROMETHAMINE 250 UG/ML
250 INJECTION, SOLUTION INTRAMUSCULAR
Status: CANCELLED | OUTPATIENT
Start: 2023-06-27

## 2023-06-27 RX ORDER — CALCIUM CARBONATE 200(500)MG
500 TABLET,CHEWABLE ORAL 3 TIMES DAILY PRN
Status: CANCELLED | OUTPATIENT
Start: 2023-06-27

## 2023-06-27 RX ORDER — OXYTOCIN/RINGER'S LACTATE 30/500 ML
334 PLASTIC BAG, INJECTION (ML) INTRAVENOUS ONCE AS NEEDED
Status: CANCELLED | OUTPATIENT
Start: 2023-06-27 | End: 2034-11-23

## 2023-06-27 RX ORDER — PROCHLORPERAZINE EDISYLATE 5 MG/ML
5 INJECTION INTRAMUSCULAR; INTRAVENOUS EVERY 6 HOURS PRN
Status: CANCELLED | OUTPATIENT
Start: 2023-06-27

## 2023-06-27 RX ORDER — MISOPROSTOL 100 UG/1
800 TABLET ORAL ONCE AS NEEDED
Status: CANCELLED | OUTPATIENT
Start: 2023-06-27 | End: 2034-11-23

## 2023-06-27 RX ORDER — MISOPROSTOL 100 UG/1
800 TABLET ORAL
Status: CANCELLED | OUTPATIENT
Start: 2023-06-27

## 2023-06-27 RX ORDER — MISOPROSTOL 100 UG/1
800 TABLET ORAL ONCE AS NEEDED
Status: CANCELLED | OUTPATIENT
Start: 2023-06-27

## 2023-06-27 RX ORDER — OXYTOCIN/RINGER'S LACTATE 30/500 ML
334 PLASTIC BAG, INJECTION (ML) INTRAVENOUS ONCE
Status: CANCELLED | OUTPATIENT
Start: 2023-06-27 | End: 2023-06-27

## 2023-06-27 RX ORDER — SODIUM CHLORIDE, SODIUM LACTATE, POTASSIUM CHLORIDE, CALCIUM CHLORIDE 600; 310; 30; 20 MG/100ML; MG/100ML; MG/100ML; MG/100ML
INJECTION, SOLUTION INTRAVENOUS CONTINUOUS
Status: CANCELLED | OUTPATIENT
Start: 2023-06-27

## 2023-06-27 RX ORDER — OXYTOCIN/RINGER'S LACTATE 30/500 ML
95 PLASTIC BAG, INJECTION (ML) INTRAVENOUS ONCE AS NEEDED
Status: CANCELLED | OUTPATIENT
Start: 2023-06-27 | End: 2034-11-23

## 2023-06-27 RX ORDER — DIPHENOXYLATE HYDROCHLORIDE AND ATROPINE SULFATE 2.5; .025 MG/1; MG/1
1 TABLET ORAL 4 TIMES DAILY PRN
Status: CANCELLED | OUTPATIENT
Start: 2023-06-27

## 2023-06-27 RX ORDER — MUPIROCIN 20 MG/G
OINTMENT TOPICAL
Status: CANCELLED | OUTPATIENT
Start: 2023-06-27

## 2023-06-27 RX ORDER — SIMETHICONE 80 MG
1 TABLET,CHEWABLE ORAL 4 TIMES DAILY PRN
Status: CANCELLED | OUTPATIENT
Start: 2023-06-27

## 2023-06-27 RX ORDER — OXYTOCIN 10 [USP'U]/ML
10 INJECTION, SOLUTION INTRAMUSCULAR; INTRAVENOUS ONCE AS NEEDED
Status: CANCELLED | OUTPATIENT
Start: 2023-06-27 | End: 2034-11-23

## 2023-06-27 RX ORDER — LIDOCAINE HYDROCHLORIDE 10 MG/ML
10 INJECTION INFILTRATION; PERINEURAL ONCE AS NEEDED
Status: CANCELLED | OUTPATIENT
Start: 2023-06-27 | End: 2034-11-23

## 2023-06-27 RX ORDER — OXYTOCIN/RINGER'S LACTATE 30/500 ML
95 PLASTIC BAG, INJECTION (ML) INTRAVENOUS ONCE
Status: CANCELLED | OUTPATIENT
Start: 2023-06-27 | End: 2023-06-27
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
## 2023-06-29 ENCOUNTER — ANESTHESIA (OUTPATIENT)
Dept: OBSTETRICS AND GYNECOLOGY | Facility: HOSPITAL | Age: 28
End: 2023-06-29
Payer: MEDICAID

## 2023-06-29 ENCOUNTER — HOSPITAL ENCOUNTER (INPATIENT)
Facility: HOSPITAL | Age: 28
LOS: 2 days | Discharge: HOME | End: 2023-07-01
Attending: OBSTETRICS & GYNECOLOGY | Admitting: OBSTETRICS & GYNECOLOGY
Payer: MEDICAID

## 2023-06-29 ENCOUNTER — ANESTHESIA EVENT (OUTPATIENT)
Dept: OBSTETRICS AND GYNECOLOGY | Facility: HOSPITAL | Age: 28
End: 2023-06-29
Payer: MEDICAID

## 2023-06-29 DIAGNOSIS — Z37.9 NORMAL LABOR: ICD-10-CM

## 2023-06-29 DIAGNOSIS — Z34.90 ENCOUNTER FOR ELECTIVE INDUCTION OF LABOR: ICD-10-CM

## 2023-06-29 PROBLEM — O47.9 THREATENED LABOR AT TERM: Status: RESOLVED | Noted: 2023-06-29 | Resolved: 2023-06-29

## 2023-06-29 PROBLEM — O47.9 THREATENED LABOR AT TERM: Status: ACTIVE | Noted: 2023-06-29

## 2023-06-29 PROBLEM — Z34.83 ENCOUNTER FOR SUPERVISION OF OTHER NORMAL PREGNANCY, THIRD TRIMESTER: Status: RESOLVED | Noted: 2022-11-20 | Resolved: 2023-06-29

## 2023-06-29 LAB
ALBUMIN SERPL BCP-MCNC: 3.4 G/DL (ref 3.5–5.2)
ALP SERPL-CCNC: 208 U/L (ref 55–135)
ALT SERPL W/O P-5'-P-CCNC: <5 U/L (ref 10–44)
ANION GAP SERPL CALC-SCNC: 15 MMOL/L (ref 8–16)
AST SERPL-CCNC: 13 U/L (ref 10–40)
BASOPHILS # BLD AUTO: 0.09 K/UL (ref 0–0.2)
BASOPHILS NFR BLD: 0.6 % (ref 0–1.9)
BILIRUB SERPL-MCNC: 0.5 MG/DL (ref 0.1–1)
BUN SERPL-MCNC: 8 MG/DL (ref 6–20)
CALCIUM SERPL-MCNC: 9.4 MG/DL (ref 8.7–10.5)
CHLORIDE SERPL-SCNC: 105 MMOL/L (ref 95–110)
CO2 SERPL-SCNC: 19 MMOL/L (ref 23–29)
CREAT SERPL-MCNC: 0.7 MG/DL (ref 0.5–1.4)
DIFFERENTIAL METHOD: ABNORMAL
EOSINOPHIL # BLD AUTO: 0.2 K/UL (ref 0–0.5)
EOSINOPHIL NFR BLD: 1.1 % (ref 0–8)
ERYTHROCYTE [DISTWIDTH] IN BLOOD BY AUTOMATED COUNT: 12.5 % (ref 11.5–14.5)
EST. GFR  (NO RACE VARIABLE): >60 ML/MIN/1.73 M^2
GLUCOSE SERPL-MCNC: 87 MG/DL (ref 70–110)
HCT VFR BLD AUTO: 36.3 % (ref 37–48.5)
HGB BLD-MCNC: 12.6 G/DL (ref 12–16)
IMM GRANULOCYTES # BLD AUTO: 0.35 K/UL (ref 0–0.04)
IMM GRANULOCYTES NFR BLD AUTO: 2.4 % (ref 0–0.5)
LYMPHOCYTES # BLD AUTO: 3.6 K/UL (ref 1–4.8)
LYMPHOCYTES NFR BLD: 24.3 % (ref 18–48)
MCH RBC QN AUTO: 33.6 PG (ref 27–31)
MCHC RBC AUTO-ENTMCNC: 34.7 G/DL (ref 32–36)
MCV RBC AUTO: 97 FL (ref 82–98)
MONOCYTES # BLD AUTO: 1 K/UL (ref 0.3–1)
MONOCYTES NFR BLD: 6.4 % (ref 4–15)
NEUTROPHILS # BLD AUTO: 9.7 K/UL (ref 1.8–7.7)
NEUTROPHILS NFR BLD: 65.2 % (ref 38–73)
NRBC BLD-RTO: 0 /100 WBC
PLATELET # BLD AUTO: 193 K/UL (ref 150–450)
PMV BLD AUTO: 11.7 FL (ref 9.2–12.9)
POTASSIUM SERPL-SCNC: 3.5 MMOL/L (ref 3.5–5.1)
PROT SERPL-MCNC: 7 G/DL (ref 6–8.4)
RBC # BLD AUTO: 3.75 M/UL (ref 4–5.4)
SODIUM SERPL-SCNC: 139 MMOL/L (ref 136–145)
WBC # BLD AUTO: 14.84 K/UL (ref 3.9–12.7)

## 2023-06-29 PROCEDURE — 62326 NJX INTERLAMINAR LMBR/SAC: CPT | Performed by: NURSE ANESTHETIST, CERTIFIED REGISTERED

## 2023-06-29 PROCEDURE — 59409 OBSTETRICAL CARE: CPT | Mod: AT,,, | Performed by: ADVANCED PRACTICE MIDWIFE

## 2023-06-29 PROCEDURE — 72200004 HC VAGINAL DELIVERY LEVEL I

## 2023-06-29 PROCEDURE — 27200710 HC EPIDURAL INFUSION PUMP SET: Performed by: STUDENT IN AN ORGANIZED HEALTH CARE EDUCATION/TRAINING PROGRAM

## 2023-06-29 PROCEDURE — 86900 BLOOD TYPING SEROLOGIC ABO: CPT | Performed by: ADVANCED PRACTICE MIDWIFE

## 2023-06-29 PROCEDURE — 85025 COMPLETE CBC W/AUTO DIFF WBC: CPT | Performed by: ADVANCED PRACTICE MIDWIFE

## 2023-06-29 PROCEDURE — 80053 COMPREHEN METABOLIC PANEL: CPT | Performed by: ADVANCED PRACTICE MIDWIFE

## 2023-06-29 PROCEDURE — 59409 PR OBSTETRICAL CARE,VAG DELIV ONLY: ICD-10-PCS | Mod: AT,,, | Performed by: ADVANCED PRACTICE MIDWIFE

## 2023-06-29 PROCEDURE — 11000001 HC ACUTE MED/SURG PRIVATE ROOM

## 2023-06-29 PROCEDURE — 63600175 PHARM REV CODE 636 W HCPCS: Performed by: NURSE ANESTHETIST, CERTIFIED REGISTERED

## 2023-06-29 PROCEDURE — 27800516 HC TRAY, EPIDURAL COMBO: Performed by: STUDENT IN AN ORGANIZED HEALTH CARE EDUCATION/TRAINING PROGRAM

## 2023-06-29 PROCEDURE — 72100002 HC LABOR CARE, 1ST 8 HOURS

## 2023-06-29 RX ORDER — OXYTOCIN/RINGER'S LACTATE 30/500 ML
95 PLASTIC BAG, INJECTION (ML) INTRAVENOUS ONCE AS NEEDED
Status: DISCONTINUED | OUTPATIENT
Start: 2023-06-30 | End: 2023-06-30

## 2023-06-29 RX ORDER — MISOPROSTOL 200 UG/1
800 TABLET ORAL ONCE AS NEEDED
Status: DISCONTINUED | OUTPATIENT
Start: 2023-06-30 | End: 2023-06-30

## 2023-06-29 RX ORDER — TRANEXAMIC ACID 10 MG/ML
1000 INJECTION, SOLUTION INTRAVENOUS ONCE AS NEEDED
Status: DISCONTINUED | OUTPATIENT
Start: 2023-06-29 | End: 2023-06-30

## 2023-06-29 RX ORDER — MISOPROSTOL 200 UG/1
800 TABLET ORAL
Status: DISCONTINUED | OUTPATIENT
Start: 2023-06-30 | End: 2023-06-30

## 2023-06-29 RX ORDER — SIMETHICONE 80 MG
1 TABLET,CHEWABLE ORAL 4 TIMES DAILY PRN
Status: DISCONTINUED | OUTPATIENT
Start: 2023-06-30 | End: 2023-06-30

## 2023-06-29 RX ORDER — OXYTOCIN/RINGER'S LACTATE 30/500 ML
334 PLASTIC BAG, INJECTION (ML) INTRAVENOUS ONCE AS NEEDED
Status: DISCONTINUED | OUTPATIENT
Start: 2023-06-29 | End: 2023-06-30

## 2023-06-29 RX ORDER — DIPHENOXYLATE HYDROCHLORIDE AND ATROPINE SULFATE 2.5; .025 MG/1; MG/1
1 TABLET ORAL 4 TIMES DAILY PRN
Status: DISCONTINUED | OUTPATIENT
Start: 2023-06-30 | End: 2023-06-30

## 2023-06-29 RX ORDER — ROPIVACAINE HYDROCHLORIDE 2 MG/ML
INJECTION, SOLUTION EPIDURAL; INFILTRATION
Status: COMPLETED | OUTPATIENT
Start: 2023-06-29 | End: 2023-06-29

## 2023-06-29 RX ORDER — ONDANSETRON 8 MG/1
8 TABLET, ORALLY DISINTEGRATING ORAL EVERY 8 HOURS PRN
Status: DISCONTINUED | OUTPATIENT
Start: 2023-06-30 | End: 2023-06-30

## 2023-06-29 RX ORDER — CARBOPROST TROMETHAMINE 250 UG/ML
250 INJECTION, SOLUTION INTRAMUSCULAR
Status: DISCONTINUED | OUTPATIENT
Start: 2023-06-30 | End: 2023-06-30

## 2023-06-29 RX ORDER — LIDOCAINE HYDROCHLORIDE 10 MG/ML
10 INJECTION, SOLUTION EPIDURAL; INFILTRATION; INTRACAUDAL; PERINEURAL ONCE AS NEEDED
Status: DISCONTINUED | OUTPATIENT
Start: 2023-06-30 | End: 2023-06-30

## 2023-06-29 RX ORDER — SODIUM CHLORIDE, SODIUM LACTATE, POTASSIUM CHLORIDE, CALCIUM CHLORIDE 600; 310; 30; 20 MG/100ML; MG/100ML; MG/100ML; MG/100ML
INJECTION, SOLUTION INTRAVENOUS CONTINUOUS
Status: DISCONTINUED | OUTPATIENT
Start: 2023-06-30 | End: 2023-06-30

## 2023-06-29 RX ORDER — SODIUM CHLORIDE 9 MG/ML
INJECTION, SOLUTION INTRAVENOUS
Status: DISCONTINUED | OUTPATIENT
Start: 2023-06-30 | End: 2023-06-30

## 2023-06-29 RX ORDER — OXYTOCIN/RINGER'S LACTATE 30/500 ML
334 PLASTIC BAG, INJECTION (ML) INTRAVENOUS ONCE
Status: DISCONTINUED | OUTPATIENT
Start: 2023-06-29 | End: 2023-06-30

## 2023-06-29 RX ORDER — CALCIUM CARBONATE 200(500)MG
500 TABLET,CHEWABLE ORAL 3 TIMES DAILY PRN
Status: DISCONTINUED | OUTPATIENT
Start: 2023-06-30 | End: 2023-06-30

## 2023-06-29 RX ORDER — ROPIVACAINE HYDROCHLORIDE 2 MG/ML
INJECTION, SOLUTION EPIDURAL; INFILTRATION CONTINUOUS PRN
Status: DISCONTINUED | OUTPATIENT
Start: 2023-06-29 | End: 2023-06-29

## 2023-06-29 RX ORDER — METHYLERGONOVINE MALEATE 0.2 MG/ML
200 INJECTION INTRAVENOUS
Status: DISCONTINUED | OUTPATIENT
Start: 2023-06-30 | End: 2023-06-30

## 2023-06-29 RX ORDER — PROCHLORPERAZINE EDISYLATE 5 MG/ML
5 INJECTION INTRAMUSCULAR; INTRAVENOUS EVERY 6 HOURS PRN
Status: DISCONTINUED | OUTPATIENT
Start: 2023-06-30 | End: 2023-06-30

## 2023-06-29 RX ORDER — OXYTOCIN 10 [USP'U]/ML
10 INJECTION, SOLUTION INTRAMUSCULAR; INTRAVENOUS ONCE AS NEEDED
Status: DISCONTINUED | OUTPATIENT
Start: 2023-06-30 | End: 2023-06-30

## 2023-06-29 RX ORDER — MISOPROSTOL 200 UG/1
800 TABLET ORAL ONCE AS NEEDED
Status: DISCONTINUED | OUTPATIENT
Start: 2023-06-29 | End: 2023-06-30

## 2023-06-29 RX ORDER — OXYTOCIN/RINGER'S LACTATE 30/500 ML
95 PLASTIC BAG, INJECTION (ML) INTRAVENOUS ONCE
Status: DISCONTINUED | OUTPATIENT
Start: 2023-06-30 | End: 2023-06-30

## 2023-06-29 RX ADMIN — ROPIVACAINE HYDROCHLORIDE 12 ML/HR: 2 INJECTION, SOLUTION EPIDURAL; INFILTRATION at 11:06

## 2023-06-29 RX ADMIN — ROPIVACAINE HYDROCHLORIDE 10 ML: 2 INJECTION, SOLUTION EPIDURAL; INFILTRATION at 10:06

## 2023-06-30 LAB
ABO + RH BLD: NORMAL
AMPHET+METHAMPHET UR QL: NEGATIVE
BARBITURATES UR QL SCN>200 NG/ML: NEGATIVE
BENZODIAZ UR QL SCN>200 NG/ML: NEGATIVE
BLD GP AB SCN CELLS X3 SERPL QL: NORMAL
BZE UR QL SCN: NEGATIVE
CANNABINOIDS UR QL SCN: ABNORMAL
CREAT UR-MCNC: 45.8 MG/DL (ref 15–325)
METHADONE UR QL SCN>300 NG/ML: NEGATIVE
OPIATES UR QL SCN: NEGATIVE
PCP UR QL SCN>25 NG/ML: NEGATIVE
TOXICOLOGY INFORMATION: ABNORMAL

## 2023-06-30 PROCEDURE — 99231 PR SUBSEQUENT HOSPITAL CARE,LEVL I: ICD-10-PCS | Mod: ,,, | Performed by: MIDWIFE

## 2023-06-30 PROCEDURE — 80307 DRUG TEST PRSMV CHEM ANLYZR: CPT | Performed by: ADVANCED PRACTICE MIDWIFE

## 2023-06-30 PROCEDURE — 25000003 PHARM REV CODE 250: Performed by: ADVANCED PRACTICE MIDWIFE

## 2023-06-30 PROCEDURE — 11000001 HC ACUTE MED/SURG PRIVATE ROOM

## 2023-06-30 PROCEDURE — 99231 SBSQ HOSP IP/OBS SF/LOW 25: CPT | Mod: ,,, | Performed by: MIDWIFE

## 2023-06-30 RX ORDER — OXYTOCIN/RINGER'S LACTATE 30/500 ML
334 PLASTIC BAG, INJECTION (ML) INTRAVENOUS ONCE AS NEEDED
Status: DISCONTINUED | OUTPATIENT
Start: 2023-06-30 | End: 2023-07-01 | Stop reason: HOSPADM

## 2023-06-30 RX ORDER — LIDOCAINE HYDROCHLORIDE 10 MG/ML
10 INJECTION, SOLUTION EPIDURAL; INFILTRATION; INTRACAUDAL; PERINEURAL ONCE AS NEEDED
Status: DISCONTINUED | OUTPATIENT
Start: 2023-06-30 | End: 2023-07-01 | Stop reason: HOSPADM

## 2023-06-30 RX ORDER — SIMETHICONE 80 MG
1 TABLET,CHEWABLE ORAL EVERY 6 HOURS PRN
Status: DISCONTINUED | OUTPATIENT
Start: 2023-06-30 | End: 2023-07-01 | Stop reason: HOSPADM

## 2023-06-30 RX ORDER — ACETAMINOPHEN 325 MG/1
650 TABLET ORAL EVERY 6 HOURS PRN
Status: DISCONTINUED | OUTPATIENT
Start: 2023-06-30 | End: 2023-07-01 | Stop reason: HOSPADM

## 2023-06-30 RX ORDER — OXYTOCIN/RINGER'S LACTATE 30/500 ML
334 PLASTIC BAG, INJECTION (ML) INTRAVENOUS ONCE
Status: DISCONTINUED | OUTPATIENT
Start: 2023-06-30 | End: 2023-07-01 | Stop reason: HOSPADM

## 2023-06-30 RX ORDER — SODIUM CHLORIDE, SODIUM LACTATE, POTASSIUM CHLORIDE, CALCIUM CHLORIDE 600; 310; 30; 20 MG/100ML; MG/100ML; MG/100ML; MG/100ML
INJECTION, SOLUTION INTRAVENOUS CONTINUOUS
Status: DISCONTINUED | OUTPATIENT
Start: 2023-06-30 | End: 2023-07-01 | Stop reason: HOSPADM

## 2023-06-30 RX ORDER — CARBOPROST TROMETHAMINE 250 UG/ML
250 INJECTION, SOLUTION INTRAMUSCULAR
Status: DISCONTINUED | OUTPATIENT
Start: 2023-06-30 | End: 2023-07-01 | Stop reason: HOSPADM

## 2023-06-30 RX ORDER — TRANEXAMIC ACID 10 MG/ML
1000 INJECTION, SOLUTION INTRAVENOUS ONCE AS NEEDED
Status: DISCONTINUED | OUTPATIENT
Start: 2023-06-30 | End: 2023-07-01 | Stop reason: HOSPADM

## 2023-06-30 RX ORDER — MISOPROSTOL 200 UG/1
800 TABLET ORAL
Status: DISCONTINUED | OUTPATIENT
Start: 2023-06-30 | End: 2023-07-01 | Stop reason: HOSPADM

## 2023-06-30 RX ORDER — SODIUM CHLORIDE 0.9 % (FLUSH) 0.9 %
10 SYRINGE (ML) INJECTION
Status: DISCONTINUED | OUTPATIENT
Start: 2023-06-30 | End: 2023-07-01 | Stop reason: HOSPADM

## 2023-06-30 RX ORDER — MISOPROSTOL 200 UG/1
800 TABLET ORAL ONCE AS NEEDED
Status: DISCONTINUED | OUTPATIENT
Start: 2023-06-30 | End: 2023-07-01 | Stop reason: HOSPADM

## 2023-06-30 RX ORDER — HYDROCORTISONE 25 MG/G
CREAM TOPICAL 3 TIMES DAILY PRN
Status: DISCONTINUED | OUTPATIENT
Start: 2023-06-30 | End: 2023-07-01 | Stop reason: HOSPADM

## 2023-06-30 RX ORDER — MUPIROCIN 20 MG/G
OINTMENT TOPICAL
Status: DISCONTINUED | OUTPATIENT
Start: 2023-06-30 | End: 2023-07-01 | Stop reason: HOSPADM

## 2023-06-30 RX ORDER — OXYTOCIN/RINGER'S LACTATE 30/500 ML
95 PLASTIC BAG, INJECTION (ML) INTRAVENOUS ONCE
Status: DISCONTINUED | OUTPATIENT
Start: 2023-06-30 | End: 2023-07-01 | Stop reason: HOSPADM

## 2023-06-30 RX ORDER — OXYTOCIN/RINGER'S LACTATE 30/500 ML
95 PLASTIC BAG, INJECTION (ML) INTRAVENOUS ONCE AS NEEDED
Status: DISCONTINUED | OUTPATIENT
Start: 2023-06-30 | End: 2023-07-01 | Stop reason: HOSPADM

## 2023-06-30 RX ORDER — PROCHLORPERAZINE EDISYLATE 5 MG/ML
5 INJECTION INTRAMUSCULAR; INTRAVENOUS EVERY 6 HOURS PRN
Status: DISCONTINUED | OUTPATIENT
Start: 2023-06-30 | End: 2023-07-01 | Stop reason: HOSPADM

## 2023-06-30 RX ORDER — PRENATAL WITH FERROUS FUM AND FOLIC ACID 3080; 920; 120; 400; 22; 1.84; 3; 20; 10; 1; 12; 200; 27; 25; 2 [IU]/1; [IU]/1; MG/1; [IU]/1; MG/1; MG/1; MG/1; MG/1; MG/1; MG/1; UG/1; MG/1; MG/1; MG/1; MG/1
1 TABLET ORAL DAILY
Status: DISCONTINUED | OUTPATIENT
Start: 2023-06-30 | End: 2023-07-01 | Stop reason: HOSPADM

## 2023-06-30 RX ORDER — DIPHENHYDRAMINE HCL 25 MG
25 CAPSULE ORAL EVERY 4 HOURS PRN
Status: DISCONTINUED | OUTPATIENT
Start: 2023-06-30 | End: 2023-07-01 | Stop reason: HOSPADM

## 2023-06-30 RX ORDER — METHYLERGONOVINE MALEATE 0.2 MG/ML
200 INJECTION INTRAVENOUS
Status: DISCONTINUED | OUTPATIENT
Start: 2023-06-30 | End: 2023-07-01 | Stop reason: HOSPADM

## 2023-06-30 RX ORDER — OXYTOCIN 10 [USP'U]/ML
10 INJECTION, SOLUTION INTRAMUSCULAR; INTRAVENOUS ONCE AS NEEDED
Status: DISCONTINUED | OUTPATIENT
Start: 2023-06-30 | End: 2023-07-01 | Stop reason: HOSPADM

## 2023-06-30 RX ORDER — DIPHENHYDRAMINE HYDROCHLORIDE 50 MG/ML
25 INJECTION INTRAMUSCULAR; INTRAVENOUS EVERY 4 HOURS PRN
Status: DISCONTINUED | OUTPATIENT
Start: 2023-06-30 | End: 2023-07-01 | Stop reason: HOSPADM

## 2023-06-30 RX ORDER — ONDANSETRON 8 MG/1
8 TABLET, ORALLY DISINTEGRATING ORAL EVERY 8 HOURS PRN
Status: DISCONTINUED | OUTPATIENT
Start: 2023-06-30 | End: 2023-07-01 | Stop reason: HOSPADM

## 2023-06-30 RX ORDER — LIDOCAINE HYDROCHLORIDE 10 MG/ML
10 INJECTION INFILTRATION; PERINEURAL ONCE AS NEEDED
Status: DISCONTINUED | OUTPATIENT
Start: 2023-06-30 | End: 2023-06-30

## 2023-06-30 RX ORDER — IBUPROFEN 600 MG/1
600 TABLET ORAL EVERY 6 HOURS
Status: DISCONTINUED | OUTPATIENT
Start: 2023-06-30 | End: 2023-07-01 | Stop reason: HOSPADM

## 2023-06-30 RX ORDER — CALCIUM CARBONATE 200(500)MG
500 TABLET,CHEWABLE ORAL 3 TIMES DAILY PRN
Status: DISCONTINUED | OUTPATIENT
Start: 2023-06-30 | End: 2023-07-01 | Stop reason: HOSPADM

## 2023-06-30 RX ORDER — HYDROCODONE BITARTRATE AND ACETAMINOPHEN 5; 325 MG/1; MG/1
1 TABLET ORAL EVERY 4 HOURS PRN
Status: DISCONTINUED | OUTPATIENT
Start: 2023-06-30 | End: 2023-07-01 | Stop reason: HOSPADM

## 2023-06-30 RX ORDER — DOCUSATE SODIUM 100 MG/1
200 CAPSULE, LIQUID FILLED ORAL 2 TIMES DAILY PRN
Status: DISCONTINUED | OUTPATIENT
Start: 2023-06-30 | End: 2023-07-01 | Stop reason: HOSPADM

## 2023-06-30 RX ORDER — OXYTOCIN/RINGER'S LACTATE 30/500 ML
0-30 PLASTIC BAG, INJECTION (ML) INTRAVENOUS CONTINUOUS
Status: DISCONTINUED | OUTPATIENT
Start: 2023-06-30 | End: 2023-07-01 | Stop reason: HOSPADM

## 2023-06-30 RX ORDER — SIMETHICONE 80 MG
1 TABLET,CHEWABLE ORAL 4 TIMES DAILY PRN
Status: DISCONTINUED | OUTPATIENT
Start: 2023-06-30 | End: 2023-07-01 | Stop reason: HOSPADM

## 2023-06-30 RX ORDER — DIPHENOXYLATE HYDROCHLORIDE AND ATROPINE SULFATE 2.5; .025 MG/1; MG/1
1 TABLET ORAL 4 TIMES DAILY PRN
Status: DISCONTINUED | OUTPATIENT
Start: 2023-06-30 | End: 2023-07-01 | Stop reason: HOSPADM

## 2023-06-30 RX ADMIN — IBUPROFEN 600 MG: 600 TABLET, FILM COATED ORAL at 11:06

## 2023-06-30 RX ADMIN — HYDROCODONE BITARTRATE AND ACETAMINOPHEN 1 TABLET: 5; 325 TABLET ORAL at 02:06

## 2023-06-30 RX ADMIN — IBUPROFEN 600 MG: 600 TABLET, FILM COATED ORAL at 05:06

## 2023-06-30 RX ADMIN — PRENATAL VITAMINS-IRON FUMARATE 27 MG IRON-FOLIC ACID 0.8 MG TABLET 1 TABLET: at 08:06

## 2023-06-30 NOTE — SUBJECTIVE & OBJECTIVE
Obstetric HPI:  Patient reports Intensity: moderate contractions, active fetal movement, No vaginal bleeding , No loss of fluid     This pregnancy has been complicated by   HSV  THC  Chlamydia    OB History    Para Term  AB Living   3 2 2 0 0 2   SAB IAB Ectopic Multiple Live Births   0 0 0 0 2      # Outcome Date GA Lbr Isidro/2nd Weight Sex Delivery Anes PTL Lv   3 Current            2 Term 16 38w0d  2.523 kg (5 lb 9 oz) F Vag-Spont EPI  JOSE   1 Term 09/20/15 38w0d  2.523 kg (5 lb 9 oz) F Vag-Spont EPI  JOSE     Past Medical History:   Diagnosis Date    Chlamydia     HSV-2 seropositive 2023    Major depressive disorder without psychotic features 2018    Post traumatic stress disorder (PTSD) 2018     Past Surgical History:   Procedure Laterality Date    TONSILLECTOMY         PTA Medications   Medication Sig    hydrOXYzine pamoate (VISTARIL) 25 MG Cap Take 1 capsule (25 mg total) by mouth every 4 (four) hours as needed (anxiety).    ondansetron (ZOFRAN) 4 MG tablet Take 1 tablet (4 mg total) by mouth every 6 (six) hours as needed for Nausea.    promethazine (PHENERGAN) 25 MG tablet Take 1 tablet (25 mg total) by mouth every 4 (four) hours as needed for Nausea.    valACYclovir (VALTREX) 500 MG tablet Take 1 tablet (500 mg total) by mouth once daily.       Review of patient's allergies indicates:  No Known Allergies     Family History       Problem Relation (Age of Onset)    Diabetes Maternal Aunt    Hypertension Mother    Lung cancer Maternal Grandmother          Tobacco Use    Smoking status: Former    Smokeless tobacco: Never    Tobacco comments:     1/3 pack/day   Substance and Sexual Activity    Alcohol use: Not Currently    Drug use: Not Currently     Types: Marijuana, Hydrocodone     Comment: Marijuana and Norco Daily    Sexual activity: Yes     Partners: Male     Comment: ParaGard     Review of Systems   Gastrointestinal:  Positive for abdominal pain (Contractions).    Genitourinary:  Negative for vaginal bleeding and vaginal discharge.   All other systems reviewed and are negative.   Objective:     Vital Signs (Most Recent):    Vital Signs (24h Range):  Pulse:  [71] 71  BP: (120)/(69) 120/69        There is no height or weight on file to calculate BMI.    FHT: 130bpm with moderate variability and accelerations, Cat 1 (reassuring)  TOCO:  Q 2-5 minutes     Physical Exam:   Constitutional: She is oriented to person, place, and time. She appears well-developed and well-nourished.    HENT:   Head: Normocephalic.      Cardiovascular:  Normal rate.             Pulmonary/Chest: Effort normal.        Abdominal: Soft. There is no abdominal tenderness.     Genitourinary:    Vagina and uterus normal.             Musculoskeletal: Normal range of motion and moves all extremeties.       Neurological: She is alert and oriented to person, place, and time.    Skin: Skin is warm and dry.    Psychiatric: She has a normal mood and affect. Her behavior is normal. Judgment and thought content normal.      Cervix: per RN  Dilation:  8  Effacement:  100%  Station: -1  Presentation: Vertex     Significant Labs:  Lab Results   Component Value Date    GROUPTRH O POS 11/11/2022    HEPBSAG Non-reactive 11/11/2022    STREPBCULT No Group B Streptococcus isolated 06/20/2023       I have personallly reviewed all pertinent lab results from the last 24 hours.

## 2023-06-30 NOTE — PROGRESS NOTES
[facility] ([facility])  Obstetrics  Postpartum Progress Note    Patient Name: Luisa Callaway  MRN: [id number]  Admission Date: 2023  Hospital Length of Stay: 1 days  Attending Physician: Ashlyn Zeng MD  Primary Care Provider: Primary Doctor No    Subjective:     Principal Problem: (normal spontaneous vaginal delivery)    Hospital Course:  Admit to L&D  IV LR  SCOTTIE upon arrival   Anticipate progress and vaginal delivery   23-PPD1, routine postpartum orders       Interval History: PPD1     She is doing well this morning. She is tolerating a regular diet without nausea or vomiting. She is voiding spontaneously. She is ambulating.  Vaginal bleeding is mild. She denies fever or chills. Abdominal pain is mild and controlled with oral medications. She Is breastfeeding. She desires circumcision for her male baby: not applicable.    Objective:     Vital Signs (Most Recent):  Temp: 98.6 °F (37 °C) (23 0409)  Pulse: 73 (23 0409)  Resp: 18 (23 0409)  BP: 119/72 (23 0409) Vital Signs (24h Range):  Temp:  [98.2 °F (36.8 °C)-98.6 °F (37 °C)] 98.6 °F (37 °C)  Pulse:  [73-95] 73  Resp:  [18] 18  BP: (114-135)/(68-78) 119/72        There is no height or weight on file to calculate BMI.      Intake/Output Summary (Last 24 hours) at 2023 0801  Last data filed at 2023 0546  Gross per 24 hour   Intake --   Output 800 ml   Net -800 ml         Significant Labs:  Lab Results   Component Value Date    GROUPTRH O POS 2023    HEPBSAG Non-reactive 2022    STREPBCULT No Group B Streptococcus isolated 2023     Recent Labs   Lab 23  2244   HGB 12.6   HCT 36.3*       I have personallly reviewed all pertinent lab results from the last 24 hours.    Physical Exam:   Constitutional: She is oriented to person, place, and time. She appears well-developed and well-nourished.    HENT:   Head: Normocephalic.    Eyes: Conjunctivae are normal.      Pulmonary/Chest: Effort  normal.        Abdominal: Soft.             Musculoskeletal: Normal range of motion.       Neurological: She is alert and oriented to person, place, and time.    Skin: Skin is warm and dry.    Psychiatric: She has a normal mood and affect. Her behavior is normal. Judgment and thought content normal.     Review of Systems    Assessment/Plan:     27 y.o. female  for:    *  (normal spontaneous vaginal delivery)  Routine postpartum orders     Single liveborn infant delivered vaginally  Viable female infant in the care of peds         Disposition: As patient meets milestones, will plan to discharge tomorrow.    Yessenia Servin CNM  Obstetrics  [facility] ([facility])

## 2023-06-30 NOTE — LACTATION NOTE
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 Mother reports that her first breastfeeding session went well. She reports that she heard swallows and denies pain and discomfort. Infant is sleeping comfortably, she ate last at 2350. Mother wants to eat first them breastfeed infant. Mother declines Lactation assistance.     Discussed with mother expected  behaviors and output for the first 48 hours of life.  Discussed the importance of cue based feedings on demand, unrestricted access to the breast, and frequent uninterrupted skin to skin contact. Risk and implications of artificial nipples and non medically indicated formula supplementation discussed.      Mother does not have a breast pump for home use. [employer] Electric Breast Pump Request Form provided and completed, and faxed to [employer].      Mother denies any lactation needs or concerns at this time. Lactation availability provided. Encouraged mother to call for assistance when desired. Mother verbalizes understanding of all education and counseling.

## 2023-06-30 NOTE — ANESTHESIA PROCEDURE NOTES
Epidural    Patient location during procedure: OB   Reason for block: primary anesthetic   Reason for block: labor analgesia requested by patient and obstetrician  Diagnosis: IUP, Labor Pain   Start time: 6/29/2023 10:53 PM  Timeout: 6/29/2023 10:53 PM  End time: 6/29/2023 11:01 PM    Staffing  Performing Provider: Bruce Bejarano CRNA  Authorizing Provider: Rocael Taylor MD        Preanesthetic Checklist  Completed: patient identified, IV checked, site marked, risks and benefits discussed, monitors and equipment checked, pre-op evaluation, timeout performed, anesthesia consent given, hand hygiene performed and patient being monitored  Preparation  Patient position: sitting  Prep: ChloraPrep  Patient monitoring: Pulse Ox and Blood Pressure  Reason for block: primary anesthetic   Epidural  Skin Anesthetic: lidocaine 1%  Skin Wheal: 3 mL  Administration type: continuous  Approach: midline  Interspace: L4-5    Injection technique: BOGDAN air  Needle and Epidural Catheter  Needle type: Tuohy   Needle gauge: 17  Needle length: 3.5 inches  Needle insertion depth: 6.5 cm  Catheter type: springwound and multi-orifice  Catheter size: 19 G  Catheter at skin depth: 14 cm  Insertion Attempts: 1  Test dose: 3 mL of lidocaine 1.5% with Epi 1-to-200,000  Additional Documentation: incremental injection, no paresthesia on injection, negative aspiration for heme and CSF, no significant pain on injection, no signs/symptoms of IV or SA injection and no significant complaints from patient  Needle localization: anatomical landmarks  Assessment  Upper dermatomal levels - Left: T10  Right: T10   Dermatomal levels determined by alcohol wipe  Ease of block: easy  Patient's tolerance of the procedure: comfortable throughout block and no complaints No inadvertent dural puncture with Tuohy.  Dural puncture not performed with spinal needle    Medications:    Medications: ropivacaine (NAROPIN) solution 0.2% - Epidural   10 mL - 6/29/2023  10:56:00 PM

## 2023-06-30 NOTE — H&P
[facility]  Obstetrics  History & Physical    Patient Name: Luisa Callaway  MRN: [id number]  Admission Date: 2023  Primary Care Provider: Primary Doctor No    Subjective:     Principal Problem:Normal labor    History of Present Illness:  Arrived via EMS for contractions        Obstetric HPI:  Patient reports Intensity: moderate contractions, active fetal movement, No vaginal bleeding , No loss of fluid     This pregnancy has been complicated by   HSV  THC  Chlamydia    OB History    Para Term  AB Living   3 2 2 0 0 2   SAB IAB Ectopic Multiple Live Births   0 0 0 0 2      # Outcome Date GA Lbr Isidro/2nd Weight Sex Delivery Anes PTL Lv   3 Current            2 Term 16 38w0d  2.523 kg (5 lb 9 oz) F Vag-Spont EPI  JOSE   1 Term 09/20/15 38w0d  2.523 kg (5 lb 9 oz) F Vag-Spont EPI  JOSE     Past Medical History:   Diagnosis Date    Chlamydia     HSV-2 seropositive 2023    Major depressive disorder without psychotic features 2018    Post traumatic stress disorder (PTSD) 2018     Past Surgical History:   Procedure Laterality Date    TONSILLECTOMY         PTA Medications   Medication Sig    hydrOXYzine pamoate (VISTARIL) 25 MG Cap Take 1 capsule (25 mg total) by mouth every 4 (four) hours as needed (anxiety).    ondansetron (ZOFRAN) 4 MG tablet Take 1 tablet (4 mg total) by mouth every 6 (six) hours as needed for Nausea.    promethazine (PHENERGAN) 25 MG tablet Take 1 tablet (25 mg total) by mouth every 4 (four) hours as needed for Nausea.    valACYclovir (VALTREX) 500 MG tablet Take 1 tablet (500 mg total) by mouth once daily.       Review of patient's allergies indicates:  No Known Allergies     Family History       Problem Relation (Age of Onset)    Diabetes Maternal Aunt    Hypertension Mother    Lung cancer Maternal Grandmother          Tobacco Use    Smoking status: Former    Smokeless tobacco: Never    Tobacco comments:     1/3 pack/day   Substance and  Sexual Activity    Alcohol use: Not Currently    Drug use: Not Currently     Types: Marijuana, Hydrocodone     Comment: Marijuana and Norco Daily    Sexual activity: Yes     Partners: Male     Comment: [employer]     Review of Systems   Gastrointestinal:  Positive for abdominal pain (Contractions).   Genitourinary:  Negative for vaginal bleeding and vaginal discharge.   All other systems reviewed and are negative.   Objective:     Vital Signs (Most Recent):    Vital Signs (24h Range):  Pulse:  [71] 71  BP: (120)/(69) 120/69        There is no height or weight on file to calculate BMI.    FHT: 130bpm with moderate variability and accelerations, Cat 1 (reassuring)  TOCO:  Q 2-5 minutes     Physical Exam:   Constitutional: She is oriented to person, place, and time. She appears well-developed and well-nourished.    HENT:   Head: Normocephalic.      Cardiovascular:  Normal rate.             Pulmonary/Chest: Effort normal.        Abdominal: Soft. There is no abdominal tenderness.     Genitourinary:    Vagina and uterus normal.             Musculoskeletal: Normal range of motion and moves all extremeties.       Neurological: She is alert and oriented to person, place, and time.    Skin: Skin is warm and dry.    Psychiatric: She has a normal mood and affect. Her behavior is normal. Judgment and thought content normal.      Cervix: per RN  Dilation:  8  Effacement:  100%  Station: -1  Presentation: Vertex     Significant Labs:  Lab Results   Component Value Date    GROUPTRH O POS 2022    HEPBSAG Non-reactive 2022    STREPBCULT No Group B Streptococcus isolated 2023       I have personallly reviewed all pertinent lab results from the last 24 hours.    Assessment/Plan:     27 y.o. female  at 38w0d for:    * Normal labor  Admit to L&D  IV LR  SCOTTIE upon arrival   Anticipate progress and vaginal delivery         Mike Waller CNM  Obstetrics  [facility]

## 2023-06-30 NOTE — LACTATION NOTE
This note was copied from a baby's chart.  Lactation Rounds:      Visited mother at bedside. She reports that breastfeeding is going well; she denies nipple pain/discomfort and reports hearing swallowing at the breast. Mother denies any questions or concerns at this time.    Ongoing education provided including correct positioning and latch, signs of an effective feeding, early feeding cues and baby-led feeds, frequency of feeds including the normality of cluster feeding, and hand expression. Encouraged nipple care with colostrum after feedings; mother verbalizes understanding and states that she knows how to hand express.    Med[employer] Pump in Style breast pump delivered to mother at this time. Form signed and receipt given to mother. Instructed mother to wash pump parts thoroughly and sanitize prior to use. Mother verbalizes understanding.    Mother denies any further lactation needs or concerns at this time. Encouraged mother to call for assistance when desired or when infant is showing signs of hunger. Lactation availability discussed. Mother verbalizes understanding of all education and counseling.

## 2023-06-30 NOTE — ANESTHESIA PREPROCEDURE EVALUATION
06/29/2023  Luisa Callaway is a 27 y.o., female.      Pre-op Assessment    I have reviewed the Patient Summary Reports.     I have reviewed the Nursing Notes.    I have reviewed the Medications.     Review of Systems  Anesthesia Hx:  No problems with previous Anesthesia  History of prior surgery of interest to airway management or planning: Previous anesthesia: Epidural Denies Family Hx of Anesthesia complications.   Denies Personal Hx of Anesthesia complications.   Social:  Non-Smoker, No Alcohol Use    Hematology/Oncology:  Hematology Normal   Oncology Normal     EENT/Dental:EENT/Dental Normal   Cardiovascular:  Cardiovascular Normal Exercise tolerance: good  Denies Pacemaker.  Denies Hypertension.  NYHA Classification I ECG has been reviewed.    Pulmonary:  Pulmonary Normal    Renal/:  Renal/ Normal     Hepatic/GI:  Hepatic/GI Normal    Musculoskeletal:  Musculoskeletal Normal    Neurological:  Neurology Normal    Endocrine:  Endocrine Normal    Psych:   Psychiatric History          Physical Exam  General: Well nourished, Cooperative, Alert and Oriented    Airway:  Mallampati: II   Mouth Opening: Normal  TM Distance: Normal  Tongue: Normal  Neck ROM: Normal ROM    Dental:  Intact        Anesthesia Plan  Type of Anesthesia, risks & benefits discussed:    Anesthesia Type: Epidural  Intra-op Monitoring Plan: Standard ASA Monitors  Post Op Pain Control Plan: epidural analgesia  Informed Consent: Informed consent signed with the Patient and all parties understand the risks and agree with anesthesia plan.  All questions answered.   ASA Score: 2  Day of Surgery Review of History & Physical: I have interviewed and examined the patient. I have reviewed the patient's H&P dated:     Ready For Surgery From Anesthesia Perspective.     .

## 2023-06-30 NOTE — PLAN OF CARE
[facility] ([facility])  OB Initial Discharge Assessment              Swer completed discharge planning assessment with pt at bedside. Pt was easily engaged. Education on the role of  was provided. Emotional support provided throughout assessment.     Pt has two other children ages 7 and 6. FOB name not listed and will not be signing birth certificate. Baby has all essential items clothes, diapers, car seat, crib, etc. Swer inquired about prenatal care. Pt reported receiving care. Pt was aware of positive UDS for THC. Pt stated she used THC to help with nausea and stated medication prescribed did not work. Pt used once a day throughout pregnancy. Pt reported last use was on June 17, 2023. First age of use was at the age of [age]. Pt denied any SI/HI at this time. Swer explained mandating reporting.     Pt UDS + for THC.  IF BABY IS POSITIVE FOR ANY SUBSTANCES A REPORT WILL BE MADE TO [facility] HOTLINE [phone].     ONLINE REPORT FILE AS WELL.     SWER WILL REMAIN AVAILABLE THROUGHOUT PT'S ENTIRE STAY.       Baby's Name; Phoenix Lair  FOB's Name; N/A  WIC; Enrolled  Pediatrician; DAX Nava          Primary Care Provider: Primary Doctor No    Expected Discharge Date:     Initial Assessment (most recent)       OB Discharge Planning Assessment - 06/30/23 1118          OB Discharge Planning Assessment    Assessment Type Discharge Planning Assessment     Source of Information patient     Verified Demographic and Insurance Information Yes     Insurance Medicaid     Medicaid [facility]     Medicaid Insurance Primary     Pastoral Care/Clergy/ Contact Status none needed     People in Home parent(s);child(sharath), dependent     Name(s) of People in Home Lazaro Hurleykham-6     Relationship Status None     Name of Support/Comfort Primary Source Eli Callaway (mother) [phone]     Other children (include names and ages) Lazaro Kang-Sera aKur Bharat-6      Employed No     Employer N/A     Job Title N/A     Currently Enrolled in School No     Highest Level of Education Some College     Father's Involvement Limited     Is Father signing the birth certificate No     Father's Address N/A     Father Currently Enrolled in School No     Father's Employer N/A     Father's Employer Phone Number N/A     Father's Job Title N/A     Family Involvement High     Primary Contact Name and Number Fanta Ferris (family member) [phone]     Received Prenatal Care Yes     Transportation Anticipated car, drives self     Receive WIC Benefits Already certified, will apply for new born     Adoption Planned no     Infant Feeding Plan breastfeeding     Previous Breastfeeding Experience yes     Breast Pump Needed no     Does baby have crib or safe sleep space? Yes     Do you have a car seat? Yes     Pediatrician DAX Nava     Resource/Environmental Concerns none     Equipment Currently Used at Home none     DME Needed Upon Discharge  none     DCFS No indications (Indicators for Report)   mother's UDS positive; IF BABY IS POSITIVE FOR ANY SUBSTANCES [facility].    Discharge Plan A Home with family     Do you have any problems affording any of your prescribed medications? No                   Psychosocial (most recent)       OB Psychosocial Assessment - 06/30/23 1122          OB Psychosocial Assessment    Current or Previous  Service none     Anxieties, Fears or Concerns DENIED     Major Change/Loss/Stressor/Fears denies     Feels Unsafe at Home or Work/School no     Feels Threatened by Someone no     Does anyone try to keep you from having contact with others or doing things outside your home? no     Physical Signs of Abuse Present no     Have You Felt Down, Depressed or Hopeless? no     Have You Felt Little Interest or Pleasure in Doing Things? no     Feels Like Hurting Self None     Feels Like Hurting Others no     Have you ever experienced a  traumatic event? no     Have you ever witnessed a violent act? no     Have you ever experienced a life threatening injury or near death encounter? no     Current/Active Substance Abuse Yes     Substances THC     Current/Active Behavioral Health Issues No                          Healthcare Directives:
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
## 2023-06-30 NOTE — NURSING
"Discussed feeding choice with mother.  Reviewed benefits of breastfeeding.  Patient given "What to Expect in the First 48 Hours" handout. Mother states her intention is breastfeeding.       "

## 2023-06-30 NOTE — ANESTHESIA POSTPROCEDURE EVALUATION
Anesthesia Post Evaluation    Patient: Luisa Callaway    Procedure(s) Performed: * No procedures listed *    Final Anesthesia Type: epidural      Patient location during evaluation: labor & delivery  Patient participation: Yes- Able to Participate  Level of consciousness: awake and alert and oriented  Post-procedure vital signs: reviewed and stable  Pain management: adequate  Airway patency: patent  MARION mitigation strategies: Use of major conduction anesthesia (spinal/epidural) or peripheral nerve block  PONV status at discharge: No PONV  Anesthetic complications: no      Cardiovascular status: hemodynamically stable  Respiratory status: spontaneous ventilation and room air  Hydration status: euvolemic  Follow-up not needed.          Vitals Value Taken Time   /68 06/29/23 2342   Temp  06/30/23 0159   Pulse 88 06/29/23 2342   Resp 16 06/30/23 0159   SpO2 100 06/30/23 0159   Vitals shown include unvalidated device data.      No case tracking events are documented in the log.      Pain/Jaden Score: No data recorded

## 2023-06-30 NOTE — HOSPITAL COURSE
Admit to L&D  IV LR  SCOTTIE upon arrival   Anticipate progress and vaginal delivery   6/30/23-PPD1, routine postpartum orders   7/1/23:PPD2, doing well this morning. Strict D/C instructions given.

## 2023-07-01 VITALS
OXYGEN SATURATION: 99 % | HEART RATE: 65 BPM | RESPIRATION RATE: 18 BRPM | DIASTOLIC BLOOD PRESSURE: 71 MMHG | SYSTOLIC BLOOD PRESSURE: 121 MMHG | TEMPERATURE: 98 F

## 2023-07-01 PROCEDURE — 25000003 PHARM REV CODE 250: Performed by: ADVANCED PRACTICE MIDWIFE

## 2023-07-01 PROCEDURE — 99238 PR HOSPITAL DISCHARGE DAY,<30 MIN: ICD-10-PCS | Mod: ,,, | Performed by: ADVANCED PRACTICE MIDWIFE

## 2023-07-01 PROCEDURE — 99238 HOSP IP/OBS DSCHRG MGMT 30/<: CPT | Mod: ,,, | Performed by: ADVANCED PRACTICE MIDWIFE

## 2023-07-01 RX ADMIN — IBUPROFEN 600 MG: 600 TABLET, FILM COATED ORAL at 05:07

## 2023-07-01 RX ADMIN — PRENATAL VITAMINS-IRON FUMARATE 27 MG IRON-FOLIC ACID 0.8 MG TABLET 1 TABLET: at 08:07

## 2023-07-01 NOTE — PROGRESS NOTES
[facility] ([facility])  Obstetrics  Postpartum Progress Note    Patient Name: Luisa Callaway  MRN: [id number]  Admission Date: 2023  Hospital Length of Stay: 2 days  Attending Physician: Ashlyn Zeng MD  Primary Care Provider: Primary Doctor No    Subjective:     Principal Problem: (normal spontaneous vaginal delivery)    Hospital Course:  Admit to L&D  IV LR  SCOTTIE upon arrival   Anticipate progress and vaginal delivery   23-PPD1, routine postpartum orders   23:PPD2, doing well this morning. Strict D/C instructions given.       Interval History:     She is doing well this morning. She is tolerating a regular diet without nausea or vomiting. She is voiding spontaneously. She is ambulating. She has passed flatus, and has a BM. Vaginal bleeding is mild. She denies fever or chills. Abdominal pain is mild and controlled with oral medications. She Is breastfeeding. She desires circumcision for her male baby: not applicable.    Objective:     Vital Signs (Most Recent):  Temp: 97.9 °F (36.6 °C) (23 041)  Pulse: 63 (23)  Resp: 17 (23 041)  BP: 134/76 (23)  SpO2: 99 % (23) Vital Signs (24h Range):  Temp:  [97.7 °F (36.5 °C)-98.4 °F (36.9 °C)] 97.9 °F (36.6 °C)  Pulse:  [62-70] 63  Resp:  [16-18] 17  SpO2:  [99 %-100 %] 99 %  BP: (105-134)/(61-91) 134/76        There is no height or weight on file to calculate BMI.      Intake/Output Summary (Last 24 hours) at 2023 0831  Last data filed at 2023 1100  Gross per 24 hour   Intake --   Output 300 ml   Net -300 ml         Significant Labs:  Lab Results   Component Value Date    GROUPTRH O POS 2023    HEPBSAG Non-reactive 2022    STREPBCULT No Group B Streptococcus isolated 2023     Recent Labs   Lab 23  2244   HGB 12.6   HCT 36.3*       I have personallly reviewed all pertinent lab results from the last 24 hours.    Physical Exam:   Constitutional: She is oriented to person,  place, and time. She appears well-developed and well-nourished.    HENT:   Head: Normocephalic.      Cardiovascular:  Normal rate and regular rhythm.             Pulmonary/Chest: Effort normal.        Abdominal: Soft.     Genitourinary:    Vagina and uterus normal.             Musculoskeletal: Normal range of motion and moves all extremeties.       Neurological: She is alert and oriented to person, place, and time.    Skin: Skin is warm and dry.      Review of Systems    Assessment/Plan:     27 y.o. female  for:    *  (normal spontaneous vaginal delivery)  Routine postpartum orders     Single liveborn infant delivered vaginally  Viable female infant in the care of peds         Disposition: As patient meets milestones, will plan to discharge later today.    Nikkie Pantoja CNM  Obstetrics  [facility] ([facility])

## 2023-07-01 NOTE — LACTATION NOTE
This note was copied from a baby's chart.  Lactation Rounding: Infant feeding frequency and output WNL. Infant weight loss noted as -4% Upon entering room, nurse finds infant lying on mother's chest sleeping and mother sitting up in bed. Mother reports that she anticipates discharge at this time but she does not wish to receive discharge education at this time. Mother states that she has no concerns latching infant to her breast and she feels that infant is doing well breast feeding at this time.     Discussed mechanism of milk production and maintenance. Encouraged frequent feeds based on early cues, unrestricted access to the breast and frequent skin to skin contact. Discussed expected feeding and output pattern for day of life 2. Reinforced normalcy and importance of cluster feeding.     Nurse assessed mother's nipples. Mother's nipples appear WNL. No cracking, bleeding, redness,or blisters noted. Nipple care and hand expression reviewed. Mother verbalized understanding of all teaching and counseling at this time. Opportunity for questions given to mom. Mother verbalized no concerns at this time. Lactation contact information given to mother. Nurse instructed mother to call for assistance if need arises.

## 2023-07-01 NOTE — DISCHARGE INSTRUCTIONS
"Mother Self Care:    Activity: Avoid strenuous exercise and get adequate rest.  No driving until the physician consent given.  Emotional Changes: Most women find birth to be a time of great emotional upheaval.  Sense of loss, mood swings, fatigue, anxiety, and feeling "let down" are common.  If feelings worsen or last more than a week, call your physician.  Breast Care/Breastfeeding: Wear a bra for comfort.  Keep nipples dry and apply your own breast milk or lanolin cream as needed for soreness.  Engorgement can be relieved with warm, moist heat before feedings.  You may also take Ibuprofen.  Neris-Care/Vaginal Bleeding: Remember to use your neris-bottle after urinating.  Your flow will change from red, to pink, to yellow/white color over a period of 2 weeks.  Menstruation will return in 3-8 weeks, or longer if breastfeeding.  Vaginal Delivery: Warm baths, tucks, and dermoplast will promote healing.  Avoid bubble baths or strong soaps.  Sexual Activity/Pelvic Rest: No sexual activity, tampons, or douching until your physician gives you consent.  Diet: Continue to eat from the five basic food groups, including plenty of protein, fruits, vegetables, and whole grains.  Limit empty calories and high fat foods.  Drink enough fluids to satisfy thirst and add an extra 500 calories for breastfeeding.  Constipation/Hemorrhoids: Drink plenty of water.  You may take a stool softener or natural laxative (Metamucil). You may use tucks or hemorrhoid ointment and soak in a warm tub.    CALL YOUR OB DOCTOR IF ANY OF THE FOLLOWING OCCURS:  *Heavy bleeding - saturating a pad an hour or passing any large (2-3 inches in size) blood clots.  *Any pain, redness, or tenderness in lower leg.  *You cannot care for yourself or your baby.  *Any signs of infection-      - Temperature greater than 100.5 degrees F      - Foul smelling vaginal discharge       - Increased vaginal pain      - Hot, hard, red or sore area on breast      - Flu-like " symptoms      - Any urgency, frequency or burning with urination    Return To the Hospital for further Evaluation:  Headache not relieved by tylenol or ibuprofen  Blurry vision, double vision, seeing spots, or flashing lights  Feeling faint or passing out  Right epigastric pain  Difficulty breathing  Swelling in hands, face, or feet  Any of these symptoms accompanied by nausea/vomiting  Gaining more than 5 pounds in one week  Seizures  These symptoms could be an indication of elevated blood pressure.       If you have any questions that need to be answered immediately please call the Labor & Delivery Unit at [phone] and ask to speak to a nurse.

## 2023-07-01 NOTE — DISCHARGE SUMMARY
[facility] ([facility])  Obstetrics  Discharge Summary      Patient Name: Luisa Callaway  MRN: [id number]  Admission Date: 2023  Hospital Length of Stay: 2 days  Discharge Date and Time: No discharge date for patient encounter.  Attending Physician: Ashlyn Zeng MD   Discharging Provider: Nikkie Pantoja CNM   Primary Care Provider: Primary Doctor No    HPI: Arrived via EMS for contractions            * No surgery found *     Hospital Course:   Admit to L&D  IV LR  SCOTTIE upon arrival   Anticipate progress and vaginal delivery   23-PPD1, routine postpartum orders   23:PPD2, doing well this morning. Strict D/C instructions given.        Consults (From admission, onward)        Status Ordering Provider     Inpatient consult to Anesthesiology  Once        Provider:  (Not yet assigned)    Acknowledged ADE RICHARDSON          Final Active Diagnoses:    Diagnosis Date Noted POA    PRINCIPAL PROBLEM:   (normal spontaneous vaginal delivery) [O80] 2023 Not Applicable    Single liveborn infant delivered vaginally [Z38.00] 2023 No      Problems Resolved During this Admission:        Significant Diagnostic Studies: Labs: All labs within the past 24 hours have been reviewed      Feeding Method: breast    Immunizations     Date Immunization Status Dose Route/Site Given by    23 MMR Incomplete 0.5 mL Subcutaneous/     23 Tdap Incomplete 0.5 mL Intramuscular/           Delivery:    Episiotomy: None   Lacerations: None   Repair suture:     Repair # of packets:     Blood loss (ml):       Birth information:  YOB: 2025   Time of birth: 11:27 PM   Sex: female   Delivery type: Vaginal, Spontaneous   Gestational Age: 38w0d    Delivery Clinician:      Other providers:       Additional  information:  Forceps:    Vacuum:    Breech:    Observed anomalies      Living?:           APGARS  One minute Five minutes Ten minutes   Skin color:         Heart rate:          Grimace:         Muscle tone:         Breathing:         Totals: 9  9        Placenta: Delivered:       appearance    Pending Diagnostic Studies:     Procedure Component Value Units Date/Time    CBC with Auto Differential [[id number]]     Order Status: Sent Lab Status: No result     Specimen: Blood           Discharged Condition: stable    Disposition: Home or Self Care    Follow Up:   Follow-up Information     Quin Cameron CNM Follow up in 4 week(s).    Specialty: Obstetrics and Gynecology  Why: postpartum visit  Contact information:  [address]  [place] [id number] [phone]                       Patient Instructions:      Notify your health care provider if you experience any of the following:  temperature >100.4     Notify your health care provider if you experience any of the following:  persistent nausea and vomiting or diarrhea     Notify your health care provider if you experience any of the following:  severe uncontrolled pain     Notify your health care provider if you experience any of the following:  severe persistent headache     Notify your health care provider if you experience any of the following:  persistent dizziness, light-headedness, or visual disturbances     Medications:  Current Discharge Medication List      CONTINUE these medications which have NOT CHANGED    Details   hydrOXYzine pamoate (VISTARIL) 25 MG Cap Take 1 capsule (25 mg total) by mouth every 4 (four) hours as needed (anxiety).  Qty: 60 capsule, Refills: 1    Associated Diagnoses: Anxiety      ondansetron (ZOFRAN) 4 MG tablet Take 1 tablet (4 mg total) by mouth every 6 (six) hours as needed for Nausea.  Qty: 60 tablet, Refills: 3    Associated Diagnoses: Nausea/vomiting in pregnancy      promethazine (PHENERGAN) 25 MG tablet Take 1 tablet (25 mg total) by mouth every 4 (four) hours as needed for Nausea.  Qty: 60 tablet, Refills: 0    Associated Diagnoses: Nausea/vomiting in pregnancy      valACYclovir (VALTREX) 500  MG tablet Take 1 tablet (500 mg total) by mouth once daily.  Qty: 30 tablet, Refills: 3    Associated Diagnoses: HSV-2 seropositive             Nikkie Pantoja CNM  Obstetrics  [facility] ([facility])

## 2023-07-01 NOTE — SUBJECTIVE & OBJECTIVE
Interval History:     She is doing well this morning. She is tolerating a regular diet without nausea or vomiting. She is voiding spontaneously. She is ambulating. She has passed flatus, and has a BM. Vaginal bleeding is mild. She denies fever or chills. Abdominal pain is mild and controlled with oral medications. She Is breastfeeding. She desires circumcision for her male baby: not applicable.    Objective:     Vital Signs (Most Recent):  Temp: 97.9 °F (36.6 °C) (07/01/23 0415)  Pulse: 63 (07/01/23 0415)  Resp: 17 (07/01/23 0415)  BP: 134/76 (07/01/23 0415)  SpO2: 99 % (07/01/23 0415) Vital Signs (24h Range):  Temp:  [97.7 °F (36.5 °C)-98.4 °F (36.9 °C)] 97.9 °F (36.6 °C)  Pulse:  [62-70] 63  Resp:  [16-18] 17  SpO2:  [99 %-100 %] 99 %  BP: (105-134)/(61-91) 134/76        There is no height or weight on file to calculate BMI.      Intake/Output Summary (Last 24 hours) at 7/1/2023 0831  Last data filed at 6/30/2023 1100  Gross per 24 hour   Intake --   Output 300 ml   Net -300 ml         Significant Labs:  Lab Results   Component Value Date    GROUPTRH O POS 06/29/2023    HEPBSAG Non-reactive 11/11/2022    STREPBCULT No Group B Streptococcus isolated 06/20/2023     Recent Labs   Lab 06/29/23  2244   HGB 12.6   HCT 36.3*       I have personallly reviewed all pertinent lab results from the last 24 hours.    Physical Exam:   Constitutional: She is oriented to person, place, and time. She appears well-developed and well-nourished.    HENT:   Head: Normocephalic.      Cardiovascular:  Normal rate and regular rhythm.             Pulmonary/Chest: Effort normal.        Abdominal: Soft.     Genitourinary:    Vagina and uterus normal.             Musculoskeletal: Normal range of motion and moves all extremeties.       Neurological: She is alert and oriented to person, place, and time.    Skin: Skin is warm and dry.      Review of Systems

## 2023-07-19 NOTE — PLAN OF CARE
BABY'S MECONIUM POSITIVE FOR THC. A REPORT WAS MADE TO [facility] HOTLINE [phone]. WORKER CHELLE TOOK REPORT. REPORT NUMBER IS [id number].    ONLINE REPORT FILED AS WELL.

## 2024-07-25 ENCOUNTER — PATIENT MESSAGE (OUTPATIENT)
Dept: OBSTETRICS AND GYNECOLOGY | Facility: CLINIC | Age: 29
End: 2024-07-25
Payer: MEDICAID

## 2024-08-15 ENCOUNTER — PROCEDURE VISIT (OUTPATIENT)
Dept: OBSTETRICS AND GYNECOLOGY | Facility: CLINIC | Age: 29
End: 2024-08-15
Payer: MEDICAID

## 2024-08-15 ENCOUNTER — INITIAL PRENATAL (OUTPATIENT)
Dept: OBSTETRICS AND GYNECOLOGY | Facility: CLINIC | Age: 29
End: 2024-08-15
Payer: MEDICAID

## 2024-08-15 VITALS
BODY MASS INDEX: 33.51 KG/M2 | DIASTOLIC BLOOD PRESSURE: 70 MMHG | SYSTOLIC BLOOD PRESSURE: 120 MMHG | WEIGHT: 183.19 LBS

## 2024-08-15 DIAGNOSIS — F32.9 MAJOR DEPRESSIVE DISORDER WITHOUT PSYCHOTIC FEATURES: ICD-10-CM

## 2024-08-15 DIAGNOSIS — D21.9 FIBROID: ICD-10-CM

## 2024-08-15 DIAGNOSIS — R76.8 HSV-2 SEROPOSITIVE: ICD-10-CM

## 2024-08-15 DIAGNOSIS — F12.20 CANNABIS DEPENDENCE: ICD-10-CM

## 2024-08-15 DIAGNOSIS — Z13.79 GENETIC TESTING: ICD-10-CM

## 2024-08-15 DIAGNOSIS — F43.10 POST TRAUMATIC STRESS DISORDER (PTSD): ICD-10-CM

## 2024-08-15 DIAGNOSIS — Z32.00 POSSIBLE PREGNANCY: ICD-10-CM

## 2024-08-15 DIAGNOSIS — Z3A.10 10 WEEKS GESTATION OF PREGNANCY: Primary | ICD-10-CM

## 2024-08-15 DIAGNOSIS — Z34.81 ENCOUNTER FOR SUPERVISION OF OTHER NORMAL PREGNANCY IN FIRST TRIMESTER: ICD-10-CM

## 2024-08-15 PROBLEM — Z86.19 HISTORY OF CHLAMYDIA: Status: ACTIVE | Noted: 2022-11-12

## 2024-08-15 PROCEDURE — 99999 PR PBB SHADOW E&M-EST. PATIENT-LVL II: CPT | Mod: PBBFAC,,,

## 2024-08-15 PROCEDURE — 76801 OB US < 14 WKS SINGLE FETUS: CPT | Mod: PBBFAC | Performed by: OBSTETRICS & GYNECOLOGY

## 2024-08-15 PROCEDURE — 99212 OFFICE O/P EST SF 10 MIN: CPT | Mod: PBBFAC,TH
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
## 2024-09-24 ENCOUNTER — PATIENT MESSAGE (OUTPATIENT)
Dept: OTHER | Facility: OTHER | Age: 29
End: 2024-09-24
Payer: MEDICAID

## 2024-09-26 ENCOUNTER — PATIENT MESSAGE (OUTPATIENT)
Dept: ADMINISTRATIVE | Facility: OTHER | Age: 29
End: 2024-09-26
Payer: MEDICAID

## 2024-10-01 ENCOUNTER — PATIENT MESSAGE (OUTPATIENT)
Dept: OTHER | Facility: OTHER | Age: 29
End: 2024-10-01
Payer: MEDICAID

## 2024-10-22 ENCOUNTER — PATIENT MESSAGE (OUTPATIENT)
Dept: OTHER | Facility: OTHER | Age: 29
End: 2024-10-22
Payer: MEDICAID

## 2024-10-24 ENCOUNTER — ROUTINE PRENATAL (OUTPATIENT)
Dept: OBSTETRICS AND GYNECOLOGY | Facility: CLINIC | Age: 29
End: 2024-10-24
Payer: MEDICAID

## 2024-10-24 ENCOUNTER — LAB VISIT (OUTPATIENT)
Dept: LAB | Facility: HOSPITAL | Age: 29
End: 2024-10-24
Payer: MEDICAID

## 2024-10-24 VITALS
WEIGHT: 180.75 LBS | BODY MASS INDEX: 33.06 KG/M2 | SYSTOLIC BLOOD PRESSURE: 122 MMHG | DIASTOLIC BLOOD PRESSURE: 60 MMHG

## 2024-10-24 DIAGNOSIS — Z34.81 ENCOUNTER FOR SUPERVISION OF OTHER NORMAL PREGNANCY IN FIRST TRIMESTER: ICD-10-CM

## 2024-10-24 DIAGNOSIS — D21.9 FIBROID: ICD-10-CM

## 2024-10-24 DIAGNOSIS — Z36.89 ENCOUNTER FOR FETAL ANATOMIC SURVEY: ICD-10-CM

## 2024-10-24 DIAGNOSIS — Z3A.20 20 WEEKS GESTATION OF PREGNANCY: Primary | ICD-10-CM

## 2024-10-24 DIAGNOSIS — F32.9 MAJOR DEPRESSIVE DISORDER WITHOUT PSYCHOTIC FEATURES: ICD-10-CM

## 2024-10-24 DIAGNOSIS — Z13.79 GENETIC TESTING: ICD-10-CM

## 2024-10-24 DIAGNOSIS — R76.8 HSV-2 SEROPOSITIVE: ICD-10-CM

## 2024-10-24 DIAGNOSIS — Z34.82 ENCOUNTER FOR SUPERVISION OF OTHER NORMAL PREGNANCY IN SECOND TRIMESTER: ICD-10-CM

## 2024-10-24 LAB
ABO + RH BLD: NORMAL
BASOPHILS # BLD AUTO: 0.04 K/UL (ref 0–0.2)
BASOPHILS NFR BLD: 0.4 % (ref 0–1.9)
BLD GP AB SCN CELLS X3 SERPL QL: NORMAL
DIFFERENTIAL METHOD BLD: ABNORMAL
EOSINOPHIL # BLD AUTO: 0.3 K/UL (ref 0–0.5)
EOSINOPHIL NFR BLD: 3.1 % (ref 0–8)
ERYTHROCYTE [DISTWIDTH] IN BLOOD BY AUTOMATED COUNT: 13 % (ref 11.5–14.5)
HCT VFR BLD AUTO: 35.5 % (ref 37–48.5)
HGB BLD-MCNC: 11.8 G/DL (ref 12–16)
IMM GRANULOCYTES # BLD AUTO: 0.07 K/UL (ref 0–0.04)
IMM GRANULOCYTES NFR BLD AUTO: 0.7 % (ref 0–0.5)
LYMPHOCYTES # BLD AUTO: 2.3 K/UL (ref 1–4.8)
LYMPHOCYTES NFR BLD: 22.6 % (ref 18–48)
MCH RBC QN AUTO: 32.8 PG (ref 27–31)
MCHC RBC AUTO-ENTMCNC: 33.2 G/DL (ref 32–36)
MCV RBC AUTO: 99 FL (ref 82–98)
MONOCYTES # BLD AUTO: 0.5 K/UL (ref 0.3–1)
MONOCYTES NFR BLD: 5.2 % (ref 4–15)
NEUTROPHILS # BLD AUTO: 6.9 K/UL (ref 1.8–7.7)
NEUTROPHILS NFR BLD: 68 % (ref 38–73)
NRBC BLD-RTO: 0 /100 WBC
PLATELET # BLD AUTO: 217 K/UL (ref 150–450)
PMV BLD AUTO: 13 FL (ref 9.2–12.9)
RBC # BLD AUTO: 3.6 M/UL (ref 4–5.4)
SPECIMEN OUTDATE: NORMAL
TREPONEMA PALLIDUM IGG+IGM AB [PRESENCE] IN SERUM OR PLASMA BY IMMUNOASSAY: NONREACTIVE
WBC # BLD AUTO: 10.16 K/UL (ref 3.9–12.7)

## 2024-10-24 PROCEDURE — 99214 OFFICE O/P EST MOD 30 MIN: CPT | Mod: TH,S$PBB,,

## 2024-10-24 PROCEDURE — 80074 ACUTE HEPATITIS PANEL: CPT

## 2024-10-24 PROCEDURE — 87661 TRICHOMONAS VAGINALIS AMPLIF: CPT

## 2024-10-24 PROCEDURE — 83020 HEMOGLOBIN ELECTROPHORESIS: CPT

## 2024-10-24 PROCEDURE — 99212 OFFICE O/P EST SF 10 MIN: CPT | Mod: PBBFAC,25,TH

## 2024-10-24 PROCEDURE — 86900 BLOOD TYPING SEROLOGIC ABO: CPT

## 2024-10-24 PROCEDURE — 85025 COMPLETE CBC W/AUTO DIFF WBC: CPT

## 2024-10-24 PROCEDURE — 86850 RBC ANTIBODY SCREEN: CPT

## 2024-10-24 PROCEDURE — 86593 SYPHILIS TEST NON-TREP QUANT: CPT

## 2024-10-24 PROCEDURE — 87591 N.GONORRHOEAE DNA AMP PROB: CPT

## 2024-10-24 PROCEDURE — 86762 RUBELLA ANTIBODY: CPT

## 2024-10-24 PROCEDURE — 87389 HIV-1 AG W/HIV-1&-2 AB AG IA: CPT

## 2024-10-24 PROCEDURE — 86901 BLOOD TYPING SEROLOGIC RH(D): CPT

## 2024-10-24 PROCEDURE — 87086 URINE CULTURE/COLONY COUNT: CPT

## 2024-10-24 PROCEDURE — 87491 CHLMYD TRACH DNA AMP PROBE: CPT

## 2024-10-24 PROCEDURE — 99999 PR PBB SHADOW E&M-EST. PATIENT-LVL II: CPT | Mod: PBBFAC,,,

## 2024-10-24 PROCEDURE — 83021 HEMOGLOBIN CHROMOTOGRAPHY: CPT

## 2024-10-25 LAB
C TRACH DNA SPEC QL NAA+PROBE: NOT DETECTED
HAV IGM SERPL QL IA: NORMAL
HBV CORE IGM SERPL QL IA: NORMAL
HBV SURFACE AG SERPL QL IA: NORMAL
HCV AB SERPL QL IA: NORMAL
HIV 1+2 AB+HIV1 P24 AG SERPL QL IA: NORMAL
N GONORRHOEA DNA SPEC QL NAA+PROBE: NOT DETECTED
RUBV IGG SER-ACNC: 15.1 IU/ML
RUBV IGG SER-IMP: REACTIVE

## 2024-10-26 LAB
BACTERIA UR CULT: NORMAL
SPECIMEN SOURCE: NORMAL
T VAGINALIS RRNA SPEC QL NAA+PROBE: NEGATIVE

## 2024-10-26 NOTE — PROGRESS NOTES
29 y.o. female  at 20w2d   is feeling flutters /FM, denies VB, LOF or cramping  Doing well without concerns. Missed US today. Will reschedule for next week.    TWG: -2 lbs   Anatomy US NV  Genetic testing today  Connected MoMs    20 weeks gestation of pregnancy  - routine PNC   Encounter for fetal anatomic survey  -     US OB/GYN Procedure ([facility])-Future; Future    Encounter for supervision of other normal pregnancy in second trimester  -     C. trachomatis/N. gonorrhoeae by AMP DNA [facility]; Vagina  -     Trichomonas vaginalis, RNA, Qual, Urine  -     CULTURE, URINE    Genetic testing  -     [id number] Plus W/ Sex Chromosome Abnormalities* T21, T18, T13 & Y + X; Future; Expected date: 10/24/2024    HSV-2 seropositive  - valtrex at 35 weeks  Fibroid    Major depressive disorder without psychotic features  - stable no meds      Reviewed warning signs, normal FM,  labor precautions and how/when to call. Patient states understanding.  RTC x 4 wks, call or present sooner prn.

## 2024-10-28 LAB
HGB A2 MFR BLD HPLC: 3 % (ref 2.2–3.2)
HGB FRACT BLD ELPH-IMP: NORMAL
HGB FRACT BLD ELPH-IMP: NORMAL

## 2024-10-31 ENCOUNTER — PROCEDURE VISIT (OUTPATIENT)
Dept: OBSTETRICS AND GYNECOLOGY | Facility: CLINIC | Age: 29
End: 2024-10-31
Payer: MEDICAID

## 2024-10-31 DIAGNOSIS — Z36.89 ENCOUNTER FOR FETAL ANATOMIC SURVEY: ICD-10-CM

## 2024-10-31 PROCEDURE — 76805 OB US >/= 14 WKS SNGL FETUS: CPT | Mod: PBBFAC | Performed by: OBSTETRICS & GYNECOLOGY

## 2024-11-06 DIAGNOSIS — Z36.2 ENCOUNTER FOR FOLLOW-UP ULTRASOUND OF FETAL ANATOMY: Primary | ICD-10-CM

## 2024-11-18 ENCOUNTER — E-VISIT (OUTPATIENT)
Dept: OBSTETRICS AND GYNECOLOGY | Facility: CLINIC | Age: 29
End: 2024-11-18
Payer: MEDICAID

## 2024-11-18 DIAGNOSIS — Z03.89 NO DIAGNOSIS ON AXIS I: Primary | ICD-10-CM

## 2024-11-18 PROCEDURE — 99499 UNLISTED E&M SERVICE: CPT | Mod: 95,,,

## 2024-11-19 ENCOUNTER — PATIENT MESSAGE (OUTPATIENT)
Dept: OTHER | Facility: OTHER | Age: 29
End: 2024-11-19
Payer: MEDICAID

## 2024-12-02 ENCOUNTER — PATIENT MESSAGE (OUTPATIENT)
Dept: RESEARCH | Facility: OTHER | Age: 29
End: 2024-12-02
Payer: MEDICAID

## 2024-12-03 ENCOUNTER — PATIENT MESSAGE (OUTPATIENT)
Dept: OTHER | Facility: OTHER | Age: 29
End: 2024-12-03
Payer: MEDICAID

## 2024-12-04 ENCOUNTER — ROUTINE PRENATAL (OUTPATIENT)
Dept: OBSTETRICS AND GYNECOLOGY | Facility: CLINIC | Age: 29
End: 2024-12-04
Payer: MEDICAID

## 2024-12-04 ENCOUNTER — PROCEDURE VISIT (OUTPATIENT)
Dept: OBSTETRICS AND GYNECOLOGY | Facility: CLINIC | Age: 29
End: 2024-12-04
Payer: MEDICAID

## 2024-12-04 VITALS
BODY MASS INDEX: 33.19 KG/M2 | SYSTOLIC BLOOD PRESSURE: 118 MMHG | WEIGHT: 181.44 LBS | DIASTOLIC BLOOD PRESSURE: 70 MMHG

## 2024-12-04 DIAGNOSIS — Z36.2 ENCOUNTER FOR FOLLOW-UP ULTRASOUND OF FETAL ANATOMY: ICD-10-CM

## 2024-12-04 DIAGNOSIS — Z3A.26 26 WEEKS GESTATION OF PREGNANCY: Primary | ICD-10-CM

## 2024-12-04 DIAGNOSIS — D21.9 FIBROID: ICD-10-CM

## 2024-12-04 DIAGNOSIS — O21.9 NAUSEA AND VOMITING IN PREGNANCY: ICD-10-CM

## 2024-12-04 DIAGNOSIS — R76.8 HSV-2 SEROPOSITIVE: ICD-10-CM

## 2024-12-04 DIAGNOSIS — F32.9 MAJOR DEPRESSIVE DISORDER WITHOUT PSYCHOTIC FEATURES: ICD-10-CM

## 2024-12-04 PROCEDURE — 99212 OFFICE O/P EST SF 10 MIN: CPT | Mod: PBBFAC,25,TH

## 2024-12-04 PROCEDURE — 99214 OFFICE O/P EST MOD 30 MIN: CPT | Mod: TH,S$PBB,,

## 2024-12-04 PROCEDURE — 99999 PR PBB SHADOW E&M-EST. PATIENT-LVL II: CPT | Mod: PBBFAC,,,

## 2024-12-04 PROCEDURE — 76816 OB US FOLLOW-UP PER FETUS: CPT | Mod: PBBFAC | Performed by: OBSTETRICS & GYNECOLOGY

## 2024-12-04 RX ORDER — HYDROXYZINE PAMOATE 25 MG/1
25 CAPSULE ORAL EVERY 6 HOURS PRN
Qty: 60 CAPSULE | Refills: 6 | Status: SHIPPED | OUTPATIENT
Start: 2024-12-04

## 2024-12-04 RX ORDER — VALACYCLOVIR HYDROCHLORIDE 500 MG/1
500 TABLET, FILM COATED ORAL 2 TIMES DAILY
Qty: 14 TABLET | Refills: 0 | Status: SHIPPED | OUTPATIENT
Start: 2024-12-04 | End: 2024-12-11

## 2024-12-04 RX ORDER — ONDANSETRON 4 MG/1
4 TABLET, ORALLY DISINTEGRATING ORAL EVERY 6 HOURS PRN
Qty: 60 TABLET | Refills: 2 | Status: SHIPPED | OUTPATIENT
Start: 2024-12-04

## 2024-12-07 NOTE — PROGRESS NOTES
29 y.o. female  at 26w1d   Reports + FM, denies VB, LOF, or cramping  Doing well. Requesting refill on medications  US reviewed: , vertex, posterior placenta, 3VC, SLIM WNL, MVP 4.6cm, EFW 850g (1lb 14oz), 38%ile, anatomy appears complete but will await MFM review  TWG: -1 lbs   Breast pump Rx: given    Reviewed upcoming 28wk labs, (O POS) and orders placed, tdap handout provided and explained    26 weeks gestation of pregnancy  -     OB Glucose Screen; Future; Expected date: 2024  -     CBC auto differential; Future; Expected date: 2024  -     Treponema Pallidium Antibodies IgG, IgM; Future; Expected date: 2024  -     HIV 1/2 Ag/Ab (4th Gen); Future; Expected date: 2024    HSV-2 seropositive  -     valACYclovir (VALTREX) 500 MG tablet; Take 1 tablet (500 mg total) by mouth 2 (two) times daily. for 7 days  Dispense: 14 tablet; Refill: 0    Major depressive disorder without psychotic features  -     hydrOXYzine pamoate (VISTARIL) 25 MG Cap; Take 1 capsule (25 mg total) by mouth every 6 (six) hours as needed (anxiety).  Dispense: 60 capsule; Refill: 6    Nausea and vomiting in pregnancy  -     ondansetron (ZOFRAN-ODT) 4 MG TbDL; Take 1 tablet (4 mg total) by mouth every 6 (six) hours as needed (nausea and vomiting).  Dispense: 60 tablet; Refill: 2    Lactating mother  -     BREAST PUMP FOR HOME USE    Fibroid        Reviewed warning signs, normal FM,  labor precautions and how/when to call. Patient states understanding.  RTC x 2 wks, call or present sooner prn.

## 2024-12-17 ENCOUNTER — PATIENT MESSAGE (OUTPATIENT)
Dept: OTHER | Facility: OTHER | Age: 29
End: 2024-12-17
Payer: MEDICAID

## 2024-12-18 ENCOUNTER — ROUTINE PRENATAL (OUTPATIENT)
Dept: OBSTETRICS AND GYNECOLOGY | Facility: CLINIC | Age: 29
End: 2024-12-18
Payer: MEDICAID

## 2024-12-18 ENCOUNTER — LAB VISIT (OUTPATIENT)
Dept: LAB | Facility: HOSPITAL | Age: 29
End: 2024-12-18
Payer: MEDICAID

## 2024-12-18 VITALS — WEIGHT: 187.81 LBS | DIASTOLIC BLOOD PRESSURE: 60 MMHG | BODY MASS INDEX: 34.35 KG/M2 | SYSTOLIC BLOOD PRESSURE: 90 MMHG

## 2024-12-18 DIAGNOSIS — F32.9 MAJOR DEPRESSIVE DISORDER WITHOUT PSYCHOTIC FEATURES: ICD-10-CM

## 2024-12-18 DIAGNOSIS — Z23 ENCOUNTER FOR VACCINATION: ICD-10-CM

## 2024-12-18 DIAGNOSIS — R76.8 HSV-2 SEROPOSITIVE: ICD-10-CM

## 2024-12-18 DIAGNOSIS — Z3A.28 28 WEEKS GESTATION OF PREGNANCY: Primary | ICD-10-CM

## 2024-12-18 DIAGNOSIS — Z3A.26 26 WEEKS GESTATION OF PREGNANCY: ICD-10-CM

## 2024-12-18 DIAGNOSIS — D21.9 FIBROID: ICD-10-CM

## 2024-12-18 LAB
BASOPHILS # BLD AUTO: 0.05 K/UL (ref 0–0.2)
BASOPHILS NFR BLD: 0.6 % (ref 0–1.9)
DIFFERENTIAL METHOD BLD: ABNORMAL
EOSINOPHIL # BLD AUTO: 0.2 K/UL (ref 0–0.5)
EOSINOPHIL NFR BLD: 1.9 % (ref 0–8)
ERYTHROCYTE [DISTWIDTH] IN BLOOD BY AUTOMATED COUNT: 12.7 % (ref 11.5–14.5)
GLUCOSE SERPL-MCNC: 104 MG/DL (ref 70–140)
HCT VFR BLD AUTO: 34.4 % (ref 37–48.5)
HGB BLD-MCNC: 11.2 G/DL (ref 12–16)
HIV 1+2 AB+HIV1 P24 AG SERPL QL IA: NORMAL
IMM GRANULOCYTES # BLD AUTO: 0.08 K/UL (ref 0–0.04)
IMM GRANULOCYTES NFR BLD AUTO: 1 % (ref 0–0.5)
LYMPHOCYTES # BLD AUTO: 2.2 K/UL (ref 1–4.8)
LYMPHOCYTES NFR BLD: 26.1 % (ref 18–48)
MCH RBC QN AUTO: 33.1 PG (ref 27–31)
MCHC RBC AUTO-ENTMCNC: 32.6 G/DL (ref 32–36)
MCV RBC AUTO: 102 FL (ref 82–98)
MONOCYTES # BLD AUTO: 0.4 K/UL (ref 0.3–1)
MONOCYTES NFR BLD: 4.7 % (ref 4–15)
NEUTROPHILS # BLD AUTO: 5.5 K/UL (ref 1.8–7.7)
NEUTROPHILS NFR BLD: 65.7 % (ref 38–73)
NRBC BLD-RTO: 0 /100 WBC
PLATELET # BLD AUTO: 186 K/UL (ref 150–450)
PMV BLD AUTO: 12.5 FL (ref 9.2–12.9)
RBC # BLD AUTO: 3.38 M/UL (ref 4–5.4)
TREPONEMA PALLIDUM IGG+IGM AB [PRESENCE] IN SERUM OR PLASMA BY IMMUNOASSAY: NONREACTIVE
WBC # BLD AUTO: 8.29 K/UL (ref 3.9–12.7)

## 2024-12-18 PROCEDURE — 99212 OFFICE O/P EST SF 10 MIN: CPT | Mod: PBBFAC,25

## 2024-12-18 PROCEDURE — 36415 COLL VENOUS BLD VENIPUNCTURE: CPT

## 2024-12-18 PROCEDURE — 87389 HIV-1 AG W/HIV-1&-2 AB AG IA: CPT

## 2024-12-18 PROCEDURE — 99999 PR PBB SHADOW E&M-EST. PATIENT-LVL II: CPT | Mod: PBBFAC,,,

## 2024-12-18 PROCEDURE — 85025 COMPLETE CBC W/AUTO DIFF WBC: CPT

## 2024-12-18 PROCEDURE — 90715 TDAP VACCINE 7 YRS/> IM: CPT | Mod: PBBFAC

## 2024-12-18 PROCEDURE — 90471 IMMUNIZATION ADMIN: CPT | Mod: PBBFAC

## 2024-12-18 PROCEDURE — 99999PBSHW PR PBB SHADOW TECHNICAL ONLY FILED TO HB: Mod: PBBFAC,,,

## 2024-12-18 PROCEDURE — 99214 OFFICE O/P EST MOD 30 MIN: CPT | Mod: TH,S$PBB,,

## 2024-12-18 PROCEDURE — 82950 GLUCOSE TEST: CPT

## 2024-12-18 PROCEDURE — 86593 SYPHILIS TEST NON-TREP QUANT: CPT

## 2024-12-18 RX ADMIN — TETANUS TOXOID, REDUCED DIPHTHERIA TOXOID AND ACELLULAR PERTUSSIS VACCINE, ADSORBED 0.5 ML: 5; 2.5; 8; 8; 2.5 SUSPENSION INTRAMUSCULAR at 09:12

## 2024-12-18 NOTE — PROGRESS NOTES
29 y.o. female  at 28w1d   Reports + FM, denies VB, LOF or CTX  Doing well without concerns. Discussed 3T discomforts and relief measures. Patient verbalized understanding    TW lbs   28wk labs today (O POS)  Tdap today  Flu declined  LD card given and reviewed    28 weeks gestation of pregnancy  - routine PNC   Major depressive disorder without psychotic features  - no meds  HSV-2 seropositive  - valtrex at 35 weeks  Fibroid  - stable  Encounter for vaccination  -     Tdap (BOOSTRIX) vaccine injection 0.5 mL              Reviewed warning signs, normal FKCs,  labor precautions and how/when to call. Patient states understanding  RTC x 2 wks, call or present sooner prn.

## 2024-12-18 NOTE — PROGRESS NOTES
Verified two patient identifiers, allergies and medications reviewed, tdap 0.5ml  given intramuscular to left deltoid, patient tolerated well no discomfort noted, patient instructed to wait 15 minutes for any reactions.

## 2025-01-14 ENCOUNTER — PATIENT MESSAGE (OUTPATIENT)
Dept: OTHER | Facility: OTHER | Age: 30
End: 2025-01-14
Payer: MEDICAID

## 2025-01-17 DIAGNOSIS — R76.8 HSV-2 SEROPOSITIVE: Primary | ICD-10-CM

## 2025-01-17 DIAGNOSIS — O21.9 NAUSEA AND VOMITING IN PREGNANCY: ICD-10-CM

## 2025-01-17 RX ORDER — VALACYCLOVIR HYDROCHLORIDE 500 MG/1
500 TABLET, FILM COATED ORAL 2 TIMES DAILY
Qty: 60 TABLET | Refills: 11 | Status: SHIPPED | OUTPATIENT
Start: 2025-01-17 | End: 2026-01-17

## 2025-01-17 RX ORDER — ONDANSETRON 4 MG/1
4 TABLET, ORALLY DISINTEGRATING ORAL EVERY 6 HOURS PRN
Qty: 60 TABLET | Refills: 2 | Status: SHIPPED | OUTPATIENT
Start: 2025-01-17 | End: 2025-01-29 | Stop reason: SDUPTHER

## 2025-01-19 ENCOUNTER — PATIENT MESSAGE (OUTPATIENT)
Dept: OBSTETRICS AND GYNECOLOGY | Facility: CLINIC | Age: 30
End: 2025-01-19
Payer: MEDICAID

## 2025-01-21 ENCOUNTER — TELEPHONE (OUTPATIENT)
Dept: OBSTETRICS AND GYNECOLOGY | Facility: CLINIC | Age: 30
End: 2025-01-21
Payer: MEDICAID

## 2025-01-29 DIAGNOSIS — O21.9 NAUSEA AND VOMITING IN PREGNANCY: ICD-10-CM

## 2025-01-29 DIAGNOSIS — F32.9 MAJOR DEPRESSIVE DISORDER WITHOUT PSYCHOTIC FEATURES: ICD-10-CM

## 2025-01-29 RX ORDER — HYDROXYZINE PAMOATE 25 MG/1
25 CAPSULE ORAL EVERY 6 HOURS PRN
Qty: 60 CAPSULE | Refills: 6 | Status: SHIPPED | OUTPATIENT
Start: 2025-01-29

## 2025-01-29 RX ORDER — ONDANSETRON 4 MG/1
4 TABLET, ORALLY DISINTEGRATING ORAL EVERY 6 HOURS PRN
Qty: 60 TABLET | Refills: 2 | Status: SHIPPED | OUTPATIENT
Start: 2025-01-29

## 2025-02-05 ENCOUNTER — ROUTINE PRENATAL (OUTPATIENT)
Dept: OBSTETRICS AND GYNECOLOGY | Facility: CLINIC | Age: 30
End: 2025-02-05
Payer: MEDICAID

## 2025-02-05 VITALS — BODY MASS INDEX: 36.01 KG/M2 | SYSTOLIC BLOOD PRESSURE: 98 MMHG | DIASTOLIC BLOOD PRESSURE: 52 MMHG | WEIGHT: 196.88 LBS

## 2025-02-05 DIAGNOSIS — Z36.89 ENCOUNTER FOR ULTRASOUND TO ASSESS FETAL GROWTH: ICD-10-CM

## 2025-02-05 DIAGNOSIS — Z3A.35 35 WEEKS GESTATION OF PREGNANCY: Primary | ICD-10-CM

## 2025-02-05 DIAGNOSIS — F32.9 MAJOR DEPRESSIVE DISORDER WITHOUT PSYCHOTIC FEATURES: ICD-10-CM

## 2025-02-05 DIAGNOSIS — R76.8 HSV-2 SEROPOSITIVE: ICD-10-CM

## 2025-02-05 PROCEDURE — 99999 PR PBB SHADOW E&M-EST. PATIENT-LVL II: CPT | Mod: PBBFAC,,,

## 2025-02-05 PROCEDURE — 99212 OFFICE O/P EST SF 10 MIN: CPT | Mod: PBBFAC,TH

## 2025-02-05 PROCEDURE — 99214 OFFICE O/P EST MOD 30 MIN: CPT | Mod: TH,S$PBB,,

## 2025-02-05 NOTE — PROGRESS NOTES
29 y.o. female  at 35w1d   Reports + FM, denies VB, LOF or CTX  Doing well without concerns   TW lbs   Tdap 24  Declined flu  Offer RSV NV  GBS handout given and reviewed    1. 35 weeks gestation of pregnancy  - routine PNC   2. Encounter for ultrasound to assess fetal growth  -     US OB/GYN Procedure ([facility])-Future; Future    3. HSV-2 seropositive  Overview:  Valtrex @ 35w       4. Major depressive disorder without psychotic features  Overview:  No meds           Reviewed warning signs, normal FKCs,  labor precautions and how/when to call. Patient states understanding  RTC x 1 wks, call or present sooner prn

## 2025-02-13 ENCOUNTER — ROUTINE PRENATAL (OUTPATIENT)
Dept: OBSTETRICS AND GYNECOLOGY | Facility: CLINIC | Age: 30
End: 2025-02-13
Payer: MEDICAID

## 2025-02-13 ENCOUNTER — PROCEDURE VISIT (OUTPATIENT)
Dept: OBSTETRICS AND GYNECOLOGY | Facility: CLINIC | Age: 30
End: 2025-02-13
Payer: MEDICAID

## 2025-02-13 VITALS
WEIGHT: 191.13 LBS | SYSTOLIC BLOOD PRESSURE: 102 MMHG | DIASTOLIC BLOOD PRESSURE: 82 MMHG | BODY MASS INDEX: 34.96 KG/M2

## 2025-02-13 DIAGNOSIS — R76.8 HSV-2 SEROPOSITIVE: ICD-10-CM

## 2025-02-13 DIAGNOSIS — Z36.89 ENCOUNTER FOR ULTRASOUND TO ASSESS FETAL GROWTH: ICD-10-CM

## 2025-02-13 DIAGNOSIS — Z3A.36 36 WEEKS GESTATION OF PREGNANCY: Primary | ICD-10-CM

## 2025-02-13 DIAGNOSIS — F32.9 MAJOR DEPRESSIVE DISORDER WITHOUT PSYCHOTIC FEATURES: ICD-10-CM

## 2025-02-13 DIAGNOSIS — D21.9 FIBROID: ICD-10-CM

## 2025-02-13 PROCEDURE — 76816 OB US FOLLOW-UP PER FETUS: CPT | Mod: PBBFAC | Performed by: OBSTETRICS & GYNECOLOGY

## 2025-02-13 PROCEDURE — 99999 PR PBB SHADOW E&M-EST. PATIENT-LVL II: CPT | Mod: PBBFAC,,,

## 2025-02-13 PROCEDURE — 99212 OFFICE O/P EST SF 10 MIN: CPT | Mod: PBBFAC,TH

## 2025-02-13 PROCEDURE — 87653 STREP B DNA AMP PROBE: CPT

## 2025-02-13 NOTE — PROGRESS NOTES
29 y.o. female  at 36w2d  Reports + FM, denies VB, LOF or regular CTX  Doing well without concerns. Reviewed US findings and options. Patient strongly desires  and would like to proceed with ECV if candidate. Reviewed r/b of ECV. Patient verbalized understanding.   US reviewed: , breech, posterior placenta, 3VC, SLIM wNL, MVP 4.9cm, EFW 2586g (5lb 11oz), 18%ile, AC 31%  TW lbs   GBS collected today   The skin of the suprapubic region was evaluated and appears intact.  Counseled the patient to shower daily and to wash this area with an antibacterial soap such as Dial daily.  Advised her to not shave the hair from this area from now until after delivery.  I also counseled the patient to place antibacterial hand soap in all her bathrooms and kitchen at home to help facilitate proper hand hygiene practices before and after delivery.     1. 36 weeks gestation of pregnancy  -     Group B Streptococcus, PCR  -      routine PNC     2. Breech presentation, single or unspecified fetus  Overview:  25- breech  Desires ECV- scheduled for 25  Chart sent to Dr. Kong for approval    Orders:  -     L&D Bedside Procedure Scheduling; Future    3. HSV-2 seropositive  Overview:  Valtrex @ 35w       4. Fibroid  Overview:  18 x 14 x 15mm      5. Major depressive disorder without psychotic features  Overview:  No meds           Reviewed warning signs, normal FKCs,  labor precautions and how/when to call. Patient states understanding  RTC x 1 wks, call or present sooner prn

## 2025-02-15 LAB — GROUP B STREPTOCOCCUS, PCR: NEGATIVE

## 2025-02-18 ENCOUNTER — ROUTINE PRENATAL (OUTPATIENT)
Dept: OBSTETRICS AND GYNECOLOGY | Facility: CLINIC | Age: 30
End: 2025-02-18
Payer: MEDICAID

## 2025-02-18 VITALS
BODY MASS INDEX: 35.52 KG/M2 | SYSTOLIC BLOOD PRESSURE: 108 MMHG | DIASTOLIC BLOOD PRESSURE: 66 MMHG | WEIGHT: 194.25 LBS

## 2025-02-18 DIAGNOSIS — R76.8 HSV-2 SEROPOSITIVE: ICD-10-CM

## 2025-02-18 DIAGNOSIS — D21.9 FIBROID: ICD-10-CM

## 2025-02-18 DIAGNOSIS — Z3A.37 37 WEEKS GESTATION OF PREGNANCY: ICD-10-CM

## 2025-02-18 PROCEDURE — 99212 OFFICE O/P EST SF 10 MIN: CPT | Mod: PBBFAC,TH | Performed by: MIDWIFE

## 2025-02-18 NOTE — PROGRESS NOTES
29 y.o. female  at 37w0d   Reports + FM, denies VB, LOF or regular CTX  Doing well without concerns     TW lbs     1. Breech presentation, single or unspecified fetus  Overview:  25- breech  Desires ECV  Chart sent to Dr. Kong for approval-scheduled for 25      2. 37 weeks gestation of pregnancy    3. HSV-2 seropositive  Overview:  Taking Valtrex        4. Fibroid  Overview:  18 x 14 x 15mm         Reviewed warning signs, normal FKCs, labor precautions and how/when to call.  RTC x 1 wk, call or present sooner prn.

## 2025-02-20 ENCOUNTER — HOSPITAL ENCOUNTER (OUTPATIENT)
Facility: HOSPITAL | Age: 30
Discharge: HOME | End: 2025-02-20
Attending: OBSTETRICS & GYNECOLOGY | Admitting: OBSTETRICS & GYNECOLOGY
Payer: MEDICAID

## 2025-02-20 VITALS
SYSTOLIC BLOOD PRESSURE: 97 MMHG | HEART RATE: 89 BPM | WEIGHT: 194.25 LBS | RESPIRATION RATE: 18 BRPM | OXYGEN SATURATION: 100 % | DIASTOLIC BLOOD PRESSURE: 74 MMHG | BODY MASS INDEX: 35.75 KG/M2 | TEMPERATURE: 98 F | HEIGHT: 62 IN

## 2025-02-20 LAB
ABO + RH BLD: NORMAL
BASOPHILS # BLD AUTO: 0.05 K/UL (ref 0–0.2)
BASOPHILS NFR BLD: 0.6 % (ref 0–1.9)
BLD GP AB SCN CELLS X3 SERPL QL: NORMAL
DIFFERENTIAL METHOD BLD: ABNORMAL
EOSINOPHIL # BLD AUTO: 0.2 K/UL (ref 0–0.5)
EOSINOPHIL NFR BLD: 1.7 % (ref 0–8)
ERYTHROCYTE [DISTWIDTH] IN BLOOD BY AUTOMATED COUNT: 12.6 % (ref 11.5–14.5)
HCT VFR BLD AUTO: 38.5 % (ref 37–48.5)
HGB BLD-MCNC: 13.2 G/DL (ref 12–16)
IMM GRANULOCYTES # BLD AUTO: 0.08 K/UL (ref 0–0.04)
IMM GRANULOCYTES NFR BLD AUTO: 0.9 % (ref 0–0.5)
LYMPHOCYTES # BLD AUTO: 2.2 K/UL (ref 1–4.8)
LYMPHOCYTES NFR BLD: 25 % (ref 18–48)
MCH RBC QN AUTO: 33.8 PG (ref 27–31)
MCHC RBC AUTO-ENTMCNC: 34.3 G/DL (ref 32–36)
MCV RBC AUTO: 99 FL (ref 82–98)
MONOCYTES # BLD AUTO: 0.5 K/UL (ref 0.3–1)
MONOCYTES NFR BLD: 6.1 % (ref 4–15)
NEUTROPHILS # BLD AUTO: 5.8 K/UL (ref 1.8–7.7)
NEUTROPHILS NFR BLD: 65.7 % (ref 38–73)
NRBC BLD-RTO: 0 /100 WBC
PLATELET # BLD AUTO: 174 K/UL (ref 150–450)
PMV BLD AUTO: 12.5 FL (ref 9.2–12.9)
RBC # BLD AUTO: 3.91 M/UL (ref 4–5.4)
SPECIMEN OUTDATE: NORMAL
TREPONEMA PALLIDUM IGG+IGM AB [PRESENCE] IN SERUM OR PLASMA BY IMMUNOASSAY: NONREACTIVE
WBC # BLD AUTO: 8.87 K/UL (ref 3.9–12.7)

## 2025-02-20 PROCEDURE — 63600175 PHARM REV CODE 636 W HCPCS: Performed by: OBSTETRICS & GYNECOLOGY

## 2025-02-20 PROCEDURE — 59025 FETAL NON-STRESS TEST: CPT | Mod: 26,,, | Performed by: OBSTETRICS & GYNECOLOGY

## 2025-02-20 PROCEDURE — 96372 THER/PROPH/DIAG INJ SC/IM: CPT | Mod: 59

## 2025-02-20 PROCEDURE — 86900 BLOOD TYPING SEROLOGIC ABO: CPT | Performed by: OBSTETRICS & GYNECOLOGY

## 2025-02-20 PROCEDURE — 59412 ANTEPARTUM MANIPULATION: CPT | Mod: ,,, | Performed by: OBSTETRICS & GYNECOLOGY

## 2025-02-20 PROCEDURE — 96374 THER/PROPH/DIAG INJ IV PUSH: CPT

## 2025-02-20 PROCEDURE — 86593 SYPHILIS TEST NON-TREP QUANT: CPT | Performed by: OBSTETRICS & GYNECOLOGY

## 2025-02-20 PROCEDURE — 99213 OFFICE O/P EST LOW 20 MIN: CPT | Mod: TH,57,, | Performed by: OBSTETRICS & GYNECOLOGY

## 2025-02-20 PROCEDURE — 85025 COMPLETE CBC W/AUTO DIFF WBC: CPT | Performed by: OBSTETRICS & GYNECOLOGY

## 2025-02-20 PROCEDURE — 59412 ANTEPARTUM MANIPULATION: CPT

## 2025-02-20 PROCEDURE — G0378 HOSPITAL OBSERVATION PER HR: HCPCS

## 2025-02-20 PROCEDURE — 99211 OFF/OP EST MAY X REQ PHY/QHP: CPT

## 2025-02-20 RX ORDER — TERBUTALINE SULFATE 1 MG/ML
0.25 INJECTION SUBCUTANEOUS
Status: DISCONTINUED | OUTPATIENT
Start: 2025-02-20 | End: 2025-02-20 | Stop reason: HOSPADM

## 2025-02-20 RX ORDER — SODIUM CHLORIDE, SODIUM LACTATE, POTASSIUM CHLORIDE, CALCIUM CHLORIDE 600; 310; 30; 20 MG/100ML; MG/100ML; MG/100ML; MG/100ML
INJECTION, SOLUTION INTRAVENOUS CONTINUOUS
Status: DISCONTINUED | OUTPATIENT
Start: 2025-02-20 | End: 2025-02-20 | Stop reason: HOSPADM

## 2025-02-20 RX ORDER — ONDANSETRON 8 MG/1
8 TABLET, ORALLY DISINTEGRATING ORAL EVERY 8 HOURS PRN
Status: DISCONTINUED | OUTPATIENT
Start: 2025-02-20 | End: 2025-02-20 | Stop reason: HOSPADM

## 2025-02-20 RX ORDER — ACETAMINOPHEN 500 MG
500 TABLET ORAL EVERY 6 HOURS PRN
Status: DISCONTINUED | OUTPATIENT
Start: 2025-02-20 | End: 2025-02-20 | Stop reason: HOSPADM

## 2025-02-20 RX ADMIN — SODIUM CHLORIDE, POTASSIUM CHLORIDE, SODIUM LACTATE AND CALCIUM CHLORIDE: 600; 310; 30; 20 INJECTION, SOLUTION INTRAVENOUS at 11:02

## 2025-02-20 RX ADMIN — TERBUTALINE SULFATE 0.25 MG: 1 INJECTION SUBCUTANEOUS at 11:02

## 2025-02-20 NOTE — PROCEDURES
Procedure:    Consents were reviewed in detail and signed.  All questions answered.  Fetal heart tones reassuring.  Terbutaline 0.25mg IM given.  Using ultrasound guidance, upward pressure was applied suprapubically to disengage the fetus from the maternal pelvis, then manual pressure was applied to the maternal abdomen near the fundus to encourage the baby to do a forward somersault.    The baby converted successfully to a vertex presentation.      The patient tolerated the procedure well.  Continue FHT monitoring for 1 hour.

## 2025-02-20 NOTE — PROCEDURES
"Luisa Callaway is a 29 y.o. female patient.    Temp: 98.7 °F (37.1 °C) (02/20/25 1000)  Pulse: 85 (02/20/25 1115)  Resp: 20 (02/20/25 1115)  BP: 107/80 (02/20/25 1115)  SpO2: 99 % (02/20/25 1115)  Weight: 88.1 kg (194 lb 3.6 oz) (02/20/25 0829)  Height: 5' 2" (157.5 cm) (02/20/25 0829)       Obtain Fetal nonstress test (NST)    Date/Time: 2/20/2025 12:24 PM    Performed by: Fely Kong MD  Authorized by: Fely Kong MD    Nonstress Test:     Variability:  6-25 BPM    Decelerations:  None    Accelerations:  15 bpm    Baseline:  150    Uterine Irritability: No      Contractions:  Not present  Biophysical Profile:     Nonstress Test Interpretation: reactive      Overall Impression:  Reassuring  Post-procedure:     Patient tolerance:  Patient tolerated the procedure well with no immediate complications      2/20/2025    "

## 2025-02-20 NOTE — DISCHARGE INSTRUCTIONS

## 2025-02-20 NOTE — SUBJECTIVE & OBJECTIVE
Obstetric HPI:  Patient reports None contractions, active fetal movement, No vaginal bleeding , No loss of fluid     This pregnancy has been complicated by   Breech  Hx of HSV-2  Anxiety  Hx of Chlamydia    OB History    Para Term  AB Living   4 3 3 0 0 3   SAB IAB Ectopic Multiple Live Births   0 0 0 0 3      # Outcome Date GA Lbr Isidro/2nd Weight Sex Type Anes PTL Lv   4 Current            3 Term 23 38w0d  2.68 kg (5 lb 14.5 oz) F Vag-Spont EPI N JOSE      Name: GILDA,GIRL GANGA      Apgar1: 9  Apgar5: 9   2 Term 16 38w0d  2.523 kg (5 lb 9 oz) F Vag-Spont EPI  JOSE   1 Term 09/20/15 38w0d  2.523 kg (5 lb 9 oz) F Vag-Spont EPI  JOSE     Past Medical History:   Diagnosis Date    Chlamydia     HSV-2 seropositive 2023    Major depressive disorder without psychotic features 2018    Post traumatic stress disorder (PTSD) 2018     Past Surgical History:   Procedure Laterality Date    TONSILLECTOMY         PTA Medications   Medication Sig    hydrOXYzine pamoate (VISTARIL) 25 MG Cap Take 1 capsule (25 mg total) by mouth every 6 (six) hours as needed (anxiety).    ondansetron (ZOFRAN-ODT) 4 MG TbDL Take 1 tablet (4 mg total) by mouth every 6 (six) hours as needed.    valACYclovir (VALTREX) 500 MG tablet Take 1 tablet (500 mg total) by mouth 2 (two) times daily.       Review of patient's allergies indicates:  No Known Allergies     Family History       Problem Relation (Age of Onset)    Diabetes Maternal Aunt    Hypertension Mother    Lung cancer Maternal Grandmother          Tobacco Use    Smoking status: Former    Smokeless tobacco: Never    Tobacco comments:     1/3 pack/day   Substance and Sexual Activity    Alcohol use: Not Currently    Drug use: Not Currently     Types: Marijuana, Hydrocodone     Comment: Marijuana and Norco Daily    Sexual activity: Yes     Partners: Male     Comment: ParaGard     Review of Systems   Constitutional:  Negative for fatigue, fever and unexpected weight  change.   Gastrointestinal:  Negative for abdominal pain, constipation, diarrhea, nausea and vomiting.   Genitourinary:  Negative for dysuria, frequency, urgency, vaginal bleeding, vaginal discharge, vaginal pain, postcoital bleeding and vaginal odor.      Objective:     Vital Signs (Most Recent):  Temp: 98.7 °F (37.1 °C) (02/20/25 0829) Vital Signs (24h Range):  Temp:  [98.7 °F (37.1 °C)] 98.7 °F (37.1 °C)     Weight: 88.1 kg (194 lb 3.6 oz)  Body mass index is 35.52 kg/m².    FHT: Cat 1 (reassuring)  TOCO:  Q 0 minutes     Physical Exam:   Constitutional: She is oriented to person, place, and time. She appears well-developed and well-nourished. No distress.    HENT:   Head: Normocephalic and atraumatic.     Neck: No thyromegaly present.     Pulmonary/Chest: Effort normal.        Abdominal: Soft. She exhibits no distension and no mass. There is no abdominal tenderness. There is no rebound and no guarding.   Uterus gravid, soft, and non-tender             Musculoskeletal: No edema.       Neurological: She is alert and oriented to person, place, and time.    Skin: No rash noted.    Psychiatric: She has a normal mood and affect. Her behavior is normal. Judgment and thought content normal.             Significant Labs:  Lab Results   Component Value Date    GROUPTRH O POS 10/24/2024    HEPBSAG Non-reactive 10/24/2024    STREPBCULT No Group B Streptococcus isolated 06/20/2023       I have personallly reviewed all pertinent lab results from the last 24 hours.

## 2025-02-20 NOTE — HOSPITAL COURSE
Pt underwent successful ECV to vertex presentation.  NST following ECV remained reactive.  Stable for discharge to home.

## 2025-02-20 NOTE — H&P
[facility]  Obstetrics  History & Physical    Patient Name: Luisa Callaway  MRN: [id number]  Admission Date: 2025  Primary Care Provider: Pao, Primary Doctor    Subjective:     Principal Problem:Breech presentation    History of Present Illness:  30 y/o  at 37w2d presents for external cephalic version for breech.    Obstetric HPI:  Patient reports None contractions, active fetal movement, No vaginal bleeding , No loss of fluid     This pregnancy has been complicated by   Breech  Hx of HSV-2  Anxiety  Hx of Chlamydia    OB History    Para Term  AB Living   4 3 3 0 0 3   SAB IAB Ectopic Multiple Live Births   0 0 0 0 3      # Outcome Date GA Lbr Isidro/2nd Weight Sex Type Anes PTL Lv   4 Current            3 Term 23 38w0d  2.68 kg (5 lb 14.5 oz) F Vag-Spont EPI N JOSE      Name: GILDA,CURTIS SCHULER      Apgar1: 9  Apgar5: 9   2 Term 16 38w0d  2.523 kg (5 lb 9 oz) F Vag-Spont EPI  JOSE   1 Term 09/20/15 38w0d  2.523 kg (5 lb 9 oz) F Vag-Spont EPI  JOSE     Past Medical History:   Diagnosis Date    Chlamydia     HSV-2 seropositive 2023    Major depressive disorder without psychotic features 2018    Post traumatic stress disorder (PTSD) 2018     Past Surgical History:   Procedure Laterality Date    TONSILLECTOMY         PTA Medications   Medication Sig    hydrOXYzine pamoate (VISTARIL) 25 MG Cap Take 1 capsule (25 mg total) by mouth every 6 (six) hours as needed (anxiety).    ondansetron (ZOFRAN-ODT) 4 MG TbDL Take 1 tablet (4 mg total) by mouth every 6 (six) hours as needed.    valACYclovir (VALTREX) 500 MG tablet Take 1 tablet (500 mg total) by mouth 2 (two) times daily.       Review of patient's allergies indicates:  No Known Allergies     Family History       Problem Relation (Age of Onset)    Diabetes Maternal Aunt    Hypertension Mother    Lung cancer Maternal Grandmother          Tobacco Use    Smoking status: Former    Smokeless tobacco: Never    Tobacco  comments:     1/3 pack/day   Substance and Sexual Activity    Alcohol use: Not Currently    Drug use: Not Currently     Types: Marijuana, Hydrocodone     Comment: Marijuana and Norco Daily    Sexual activity: Yes     Partners: Male     Comment: [employer]ard     Review of Systems   Constitutional:  Negative for fatigue, fever and unexpected weight change.   Gastrointestinal:  Negative for abdominal pain, constipation, diarrhea, nausea and vomiting.   Genitourinary:  Negative for dysuria, frequency, urgency, vaginal bleeding, vaginal discharge, vaginal pain, postcoital bleeding and vaginal odor.      Objective:     Vital Signs (Most Recent):  Temp: 98.7 °F (37.1 °C) (25 0829) Vital Signs (24h Range):  Temp:  [98.7 °F (37.1 °C)] 98.7 °F (37.1 °C)     Weight: 88.1 kg (194 lb 3.6 oz)  Body mass index is 35.52 kg/m².    FHT: Cat 1 (reassuring)  TOCO:  Q 0 minutes     Physical Exam:   Constitutional: She is oriented to person, place, and time. She appears well-developed and well-nourished. No distress.    HENT:   Head: Normocephalic and atraumatic.     Neck: No thyromegaly present.     Pulmonary/Chest: Effort normal.        Abdominal: Soft. She exhibits no distension and no mass. There is no abdominal tenderness. There is no rebound and no guarding.   Uterus gravid, soft, and non-tender             Musculoskeletal: No edema.       Neurological: She is alert and oriented to person, place, and time.    Skin: No rash noted.    Psychiatric: She has a normal mood and affect. Her behavior is normal. Judgment and thought content normal.             Significant Labs:  Lab Results   Component Value Date    GROUPTRH O POS 10/24/2024    HEPBSAG Non-reactive 10/24/2024    STREPBCULT No Group B Streptococcus isolated 2023       I have personallly reviewed all pertinent lab results from the last 24 hours.  Assessment/Plan:     29 y.o. female  at 37w2d for:    * Breech presentation  Bedside ultrasound: breech, adequate  amniotic fluid, posterior placenta  Pt desires ECV.  Discussed procedure. Reviewed risks of pain, fetal distress, possible placental abruption, and possible need for urgent .  Reviewed success rate of 50-60%.  Consents reviewed and signed.  Proceed with ECV        Fely Kong MD  Obstetrics  [facility]

## 2025-02-20 NOTE — DISCHARGE SUMMARY
Allergen Immunotherapy: Adult  Verified patient's name and . Patient stated reason for visit today is to receive allergy shot(s). Patient denied any concerns with previous allergy injections. Allergy injections (x2) prepared and administered subcutaneous. Ice applied post-injection per patient preference. Administration of allergy injection documented in Immunotherapy flow sheet in EHR and paper chart (see for further information regarding injection). Patient left clinic ambulatory, SOTERO Simms RN ....................  2023   10:20 AM     [facility]  Obstetrics  Discharge Summary      Patient Name: Luisa Callaway  MRN: [id number]  Admission Date: 2025  Hospital Length of Stay: 0 days  Discharge Date and Time:  2025 12:26 PM  Attending Physician: Fely Kong MD   Discharging Provider: Fely Kong MD   Primary Care Provider: Pao, Primary Doctor    HPI: 30 y/o  at 37w2d presents for external cephalic version for breech.    FHT: 150Cat 1 (reassuring)  TOCO:  Q 0 minutes    * No surgery found *     Hospital Course:   Pt underwent successful ECV to vertex presentation.  NST following ECV remained reactive.  Stable for discharge to home.         Final Active Diagnoses:    Diagnosis Date Noted POA    PRINCIPAL PROBLEM:  Breech presentation [O32.1XX0] 2025 Yes      Problems Resolved During this Admission:        Significant Diagnostic Studies: Labs: CBC   Recent Labs   Lab 25  0820   WBC 8.87   HGB 13.2   HCT 38.5            Feeding Method: N/A    Immunizations       None            This patient has no babies on file.  Pending Diagnostic Studies:       Procedure Component Value Units Date/Time    Treponema Pallidium Antibodies IgG, IgM [[id number]] Collected: 25    Order Status: Sent Lab Status: No result     Specimen: Blood             Discharged Condition: good    Disposition: Home or Self Care    Follow Up:   Follow-up Information       [facility] Follow up in 1 week(s).    Specialty: Obstetrics and Gynecology  Why: routine OB visit  Contact information:  [address] [place] [address] [address] [place] [address] [phone]  Additional information:  Please take [address] for the Medical Office Building. Check in on the 3rd floor, to the right.                         Patient Instructions:      Diet Adult Regular     No dressing needed     Activity as tolerated     Medications:  Current Discharge Medication List        CONTINUE these medications which have NOT CHANGED    Details    hydrOXYzine pamoate (VISTARIL) 25 MG Cap Take 1 capsule (25 mg total) by mouth every 6 (six) hours as needed (anxiety).  Qty: 60 capsule, Refills: 6    Associated Diagnoses: Major depressive disorder without psychotic features      ondansetron (ZOFRAN-ODT) 4 MG TbDL Take 1 tablet (4 mg total) by mouth every 6 (six) hours as needed.  Qty: 60 tablet, Refills: 2    Associated Diagnoses: Nausea and vomiting in pregnancy      valACYclovir (VALTREX) 500 MG tablet Take 1 tablet (500 mg total) by mouth 2 (two) times daily.  Qty: 60 tablet, Refills: 11    Associated Diagnoses: HSV-2 seropositive             Fely Kong MD  Obstetrics  [facility]

## 2025-02-20 NOTE — ASSESSMENT & PLAN NOTE
Bedside ultrasound: breech, adequate amniotic fluid, posterior placenta  Pt desires ECV.  Discussed procedure. Reviewed risks of pain, fetal distress, possible placental abruption, and possible need for urgent .  Reviewed success rate of 50-60%.  Consents reviewed and signed.  Proceed with ECV

## 2025-02-24 ENCOUNTER — ANESTHESIA (OUTPATIENT)
Dept: OBSTETRICS AND GYNECOLOGY | Facility: HOSPITAL | Age: 30
End: 2025-02-24

## 2025-02-24 ENCOUNTER — ANESTHESIA EVENT (OUTPATIENT)
Dept: OBSTETRICS AND GYNECOLOGY | Facility: HOSPITAL | Age: 30
End: 2025-02-24

## 2025-02-24 ENCOUNTER — TELEPHONE (OUTPATIENT)
Dept: OBSTETRICS AND GYNECOLOGY | Facility: HOSPITAL | Age: 30
End: 2025-02-24
Payer: MEDICAID

## 2025-02-24 ENCOUNTER — HOSPITAL ENCOUNTER (INPATIENT)
Facility: HOSPITAL | Age: 30
LOS: 2 days | Discharge: HOME | End: 2025-02-26
Attending: OBSTETRICS & GYNECOLOGY | Admitting: OBSTETRICS & GYNECOLOGY
Payer: MEDICAID

## 2025-02-24 DIAGNOSIS — O42.90 AMNIOTIC FLUID LEAKING: ICD-10-CM

## 2025-02-24 PROBLEM — T74.91XA DOMESTIC ABUSE OF ADULT: Status: RESOLVED | Noted: 2023-04-25 | Resolved: 2025-02-24

## 2025-02-24 PROBLEM — Z86.19 HISTORY OF CHLAMYDIA: Status: RESOLVED | Noted: 2022-11-12 | Resolved: 2025-02-24

## 2025-02-24 LAB
ABO + RH BLD: NORMAL
ALBUMIN SERPL BCP-MCNC: 3.2 G/DL (ref 3.5–5.2)
ALP SERPL-CCNC: 174 U/L (ref 40–150)
ALT SERPL W/O P-5'-P-CCNC: 5 U/L (ref 10–44)
ANION GAP SERPL CALC-SCNC: 11 MMOL/L (ref 8–16)
AST SERPL-CCNC: 15 U/L (ref 10–40)
BASOPHILS # BLD AUTO: 0.06 K/UL (ref 0–0.2)
BASOPHILS NFR BLD: 0.6 % (ref 0–1.9)
BILIRUB SERPL-MCNC: 0.4 MG/DL (ref 0.1–1)
BLD GP AB SCN CELLS X3 SERPL QL: NORMAL
BUN SERPL-MCNC: 5 MG/DL (ref 6–20)
CALCIUM SERPL-MCNC: 8.7 MG/DL (ref 8.7–10.5)
CHLORIDE SERPL-SCNC: 108 MMOL/L (ref 95–110)
CO2 SERPL-SCNC: 16 MMOL/L (ref 23–29)
CREAT SERPL-MCNC: 0.6 MG/DL (ref 0.5–1.4)
DIFFERENTIAL METHOD BLD: ABNORMAL
EOSINOPHIL # BLD AUTO: 0.2 K/UL (ref 0–0.5)
EOSINOPHIL NFR BLD: 1.6 % (ref 0–8)
ERYTHROCYTE [DISTWIDTH] IN BLOOD BY AUTOMATED COUNT: 12.4 % (ref 11.5–14.5)
EST. GFR  (NO RACE VARIABLE): >60 ML/MIN/1.73 M^2
GLUCOSE SERPL-MCNC: 74 MG/DL (ref 70–110)
HCT VFR BLD AUTO: 36.8 % (ref 37–48.5)
HGB BLD-MCNC: 12.4 G/DL (ref 12–16)
HIV 1+2 AB+HIV1 P24 AG SERPL QL IA: NEGATIVE
IMM GRANULOCYTES # BLD AUTO: 0.12 K/UL (ref 0–0.04)
IMM GRANULOCYTES NFR BLD AUTO: 1.2 % (ref 0–0.5)
LYMPHOCYTES # BLD AUTO: 2.4 K/UL (ref 1–4.8)
LYMPHOCYTES NFR BLD: 23.3 % (ref 18–48)
MCH RBC QN AUTO: 32.6 PG (ref 27–31)
MCHC RBC AUTO-ENTMCNC: 33.7 G/DL (ref 32–36)
MCV RBC AUTO: 97 FL (ref 82–98)
MONOCYTES # BLD AUTO: 0.6 K/UL (ref 0.3–1)
MONOCYTES NFR BLD: 5.9 % (ref 4–15)
NEUTROPHILS # BLD AUTO: 7 K/UL (ref 1.8–7.7)
NEUTROPHILS NFR BLD: 67.4 % (ref 38–73)
NRBC BLD-RTO: 0 /100 WBC
PLATELET # BLD AUTO: 123 K/UL (ref 150–450)
PMV BLD AUTO: 12.6 FL (ref 9.2–12.9)
POTASSIUM SERPL-SCNC: 4.2 MMOL/L (ref 3.5–5.1)
PROT SERPL-MCNC: 7 G/DL (ref 6–8.4)
RBC # BLD AUTO: 3.8 M/UL (ref 4–5.4)
SODIUM SERPL-SCNC: 135 MMOL/L (ref 136–145)
TREPONEMA PALLIDUM IGG+IGM AB [PRESENCE] IN SERUM OR PLASMA BY IMMUNOASSAY: NONREACTIVE
WBC # BLD AUTO: 10.31 K/UL (ref 3.9–12.7)

## 2025-02-24 PROCEDURE — 72200005 HC VAGINAL DELIVERY LEVEL II

## 2025-02-24 PROCEDURE — 72100002 HC LABOR CARE, 1ST 8 HOURS

## 2025-02-24 PROCEDURE — 59025 FETAL NON-STRESS TEST: CPT

## 2025-02-24 PROCEDURE — 59409 OBSTETRICAL CARE: CPT | Mod: AT,,, | Performed by: MIDWIFE

## 2025-02-24 PROCEDURE — 85025 COMPLETE CBC W/AUTO DIFF WBC: CPT | Performed by: ADVANCED PRACTICE MIDWIFE

## 2025-02-24 PROCEDURE — 25000003 PHARM REV CODE 250: Performed by: ADVANCED PRACTICE MIDWIFE

## 2025-02-24 PROCEDURE — 86593 SYPHILIS TEST NON-TREP QUANT: CPT | Performed by: ADVANCED PRACTICE MIDWIFE

## 2025-02-24 PROCEDURE — 86901 BLOOD TYPING SEROLOGIC RH(D): CPT | Performed by: ADVANCED PRACTICE MIDWIFE

## 2025-02-24 PROCEDURE — 80053 COMPREHEN METABOLIC PANEL: CPT | Performed by: ADVANCED PRACTICE MIDWIFE

## 2025-02-24 PROCEDURE — 63600175 PHARM REV CODE 636 W HCPCS: Performed by: ADVANCED PRACTICE MIDWIFE

## 2025-02-24 PROCEDURE — 25000003 PHARM REV CODE 250: Performed by: MIDWIFE

## 2025-02-24 PROCEDURE — 72100003 HC LABOR CARE, EA. ADDL. 8 HRS

## 2025-02-24 PROCEDURE — 87389 HIV-1 AG W/HIV-1&-2 AB AG IA: CPT | Performed by: ADVANCED PRACTICE MIDWIFE

## 2025-02-24 PROCEDURE — 63600175 PHARM REV CODE 636 W HCPCS: Performed by: MIDWIFE

## 2025-02-24 PROCEDURE — 99211 OFF/OP EST MAY X REQ PHY/QHP: CPT

## 2025-02-24 PROCEDURE — 11000001 HC ACUTE MED/SURG PRIVATE ROOM

## 2025-02-24 RX ORDER — DIPHENOXYLATE HYDROCHLORIDE AND ATROPINE SULFATE 2.5; .025 MG/1; MG/1
2 TABLET ORAL EVERY 6 HOURS PRN
Status: DISCONTINUED | OUTPATIENT
Start: 2025-02-24 | End: 2025-02-26 | Stop reason: HOSPADM

## 2025-02-24 RX ORDER — HYDROCODONE BITARTRATE AND ACETAMINOPHEN 5; 325 MG/1; MG/1
1 TABLET ORAL EVERY 4 HOURS PRN
Status: DISCONTINUED | OUTPATIENT
Start: 2025-02-24 | End: 2025-02-26 | Stop reason: HOSPADM

## 2025-02-24 RX ORDER — OXYTOCIN 10 [USP'U]/ML
10 INJECTION, SOLUTION INTRAMUSCULAR; INTRAVENOUS ONCE AS NEEDED
Status: DISCONTINUED | OUTPATIENT
Start: 2025-02-24 | End: 2025-02-26 | Stop reason: HOSPADM

## 2025-02-24 RX ORDER — FENTANYL CITRATE 50 UG/ML
50 INJECTION, SOLUTION INTRAMUSCULAR; INTRAVENOUS ONCE
Refills: 0 | Status: COMPLETED | OUTPATIENT
Start: 2025-02-24 | End: 2025-02-24

## 2025-02-24 RX ORDER — OXYTOCIN 10 [USP'U]/ML
10 INJECTION, SOLUTION INTRAMUSCULAR; INTRAVENOUS ONCE AS NEEDED
Status: DISCONTINUED | OUTPATIENT
Start: 2025-02-24 | End: 2025-02-24

## 2025-02-24 RX ORDER — CARBOPROST TROMETHAMINE 250 UG/ML
250 INJECTION, SOLUTION INTRAMUSCULAR
Status: DISCONTINUED | OUTPATIENT
Start: 2025-02-24 | End: 2025-02-26 | Stop reason: HOSPADM

## 2025-02-24 RX ORDER — CARBOPROST TROMETHAMINE 250 UG/ML
250 INJECTION, SOLUTION INTRAMUSCULAR
Status: DISCONTINUED | OUTPATIENT
Start: 2025-02-24 | End: 2025-02-24

## 2025-02-24 RX ORDER — METHYLERGONOVINE MALEATE 0.2 MG/ML
200 INJECTION INTRAVENOUS ONCE AS NEEDED
Status: DISCONTINUED | OUTPATIENT
Start: 2025-02-24 | End: 2025-02-26 | Stop reason: HOSPADM

## 2025-02-24 RX ORDER — OXYTOCIN-SODIUM CHLORIDE 0.9% IV SOLN 30 UNIT/500ML 30-0.9/5 UT/ML-%
10 SOLUTION INTRAVENOUS ONCE AS NEEDED
Status: DISCONTINUED | OUTPATIENT
Start: 2025-02-24 | End: 2025-02-24

## 2025-02-24 RX ORDER — SODIUM CHLORIDE, SODIUM LACTATE, POTASSIUM CHLORIDE, CALCIUM CHLORIDE 600; 310; 30; 20 MG/100ML; MG/100ML; MG/100ML; MG/100ML
INJECTION, SOLUTION INTRAVENOUS CONTINUOUS
Status: DISCONTINUED | OUTPATIENT
Start: 2025-02-24 | End: 2025-02-24

## 2025-02-24 RX ORDER — ROPIVACAINE HYDROCHLORIDE 2 MG/ML
12 INJECTION, SOLUTION EPIDURAL; INFILTRATION CONTINUOUS
Status: DISCONTINUED | OUTPATIENT
Start: 2025-02-24 | End: 2025-02-24

## 2025-02-24 RX ORDER — DIPHENHYDRAMINE HYDROCHLORIDE 50 MG/ML
25 INJECTION INTRAMUSCULAR; INTRAVENOUS EVERY 4 HOURS PRN
Status: DISCONTINUED | OUTPATIENT
Start: 2025-02-24 | End: 2025-02-26 | Stop reason: HOSPADM

## 2025-02-24 RX ORDER — OXYTOCIN-SODIUM CHLORIDE 0.9% IV SOLN 30 UNIT/500ML 30-0.9/5 UT/ML-%
95 SOLUTION INTRAVENOUS CONTINUOUS PRN
Status: DISCONTINUED | OUTPATIENT
Start: 2025-02-24 | End: 2025-02-26 | Stop reason: HOSPADM

## 2025-02-24 RX ORDER — SODIUM CHLORIDE 0.9 % (FLUSH) 0.9 %
10 SYRINGE (ML) INJECTION
Status: DISCONTINUED | OUTPATIENT
Start: 2025-02-24 | End: 2025-02-26 | Stop reason: HOSPADM

## 2025-02-24 RX ORDER — TRANEXAMIC ACID 10 MG/ML
1000 INJECTION, SOLUTION INTRAVENOUS EVERY 30 MIN PRN
Status: DISCONTINUED | OUTPATIENT
Start: 2025-02-24 | End: 2025-02-24

## 2025-02-24 RX ORDER — METHYLERGONOVINE MALEATE 0.2 MG/ML
200 INJECTION INTRAVENOUS ONCE AS NEEDED
Status: DISCONTINUED | OUTPATIENT
Start: 2025-02-24 | End: 2025-02-24

## 2025-02-24 RX ORDER — DOCUSATE SODIUM 100 MG/1
200 CAPSULE, LIQUID FILLED ORAL 2 TIMES DAILY PRN
Status: DISCONTINUED | OUTPATIENT
Start: 2025-02-24 | End: 2025-02-26 | Stop reason: HOSPADM

## 2025-02-24 RX ORDER — OXYTOCIN-SODIUM CHLORIDE 0.9% IV SOLN 30 UNIT/500ML 30-0.9/5 UT/ML-%
30 SOLUTION INTRAVENOUS ONCE AS NEEDED
Status: DISCONTINUED | OUTPATIENT
Start: 2025-02-24 | End: 2025-02-26 | Stop reason: HOSPADM

## 2025-02-24 RX ORDER — TRANEXAMIC ACID 10 MG/ML
1000 INJECTION, SOLUTION INTRAVENOUS EVERY 30 MIN PRN
Status: DISCONTINUED | OUTPATIENT
Start: 2025-02-24 | End: 2025-02-26 | Stop reason: HOSPADM

## 2025-02-24 RX ORDER — HYDROCODONE BITARTRATE AND ACETAMINOPHEN 10; 325 MG/1; MG/1
1 TABLET ORAL EVERY 4 HOURS PRN
Status: DISCONTINUED | OUTPATIENT
Start: 2025-02-24 | End: 2025-02-26 | Stop reason: HOSPADM

## 2025-02-24 RX ORDER — ONDANSETRON 8 MG/1
8 TABLET, ORALLY DISINTEGRATING ORAL EVERY 8 HOURS PRN
Status: DISCONTINUED | OUTPATIENT
Start: 2025-02-24 | End: 2025-02-26 | Stop reason: HOSPADM

## 2025-02-24 RX ORDER — OXYTOCIN-SODIUM CHLORIDE 0.9% IV SOLN 30 UNIT/500ML 30-0.9/5 UT/ML-%
95 SOLUTION INTRAVENOUS CONTINUOUS PRN
Status: DISCONTINUED | OUTPATIENT
Start: 2025-02-24 | End: 2025-02-24

## 2025-02-24 RX ORDER — MISOPROSTOL 200 UG/1
800 TABLET ORAL ONCE AS NEEDED
Status: DISCONTINUED | OUTPATIENT
Start: 2025-02-24 | End: 2025-02-26 | Stop reason: HOSPADM

## 2025-02-24 RX ORDER — SIMETHICONE 80 MG
1 TABLET,CHEWABLE ORAL 4 TIMES DAILY PRN
Status: DISCONTINUED | OUTPATIENT
Start: 2025-02-24 | End: 2025-02-24

## 2025-02-24 RX ORDER — ACETAMINOPHEN 325 MG/1
650 TABLET ORAL EVERY 6 HOURS PRN
Status: DISCONTINUED | OUTPATIENT
Start: 2025-02-24 | End: 2025-02-26 | Stop reason: HOSPADM

## 2025-02-24 RX ORDER — MISOPROSTOL 200 UG/1
800 TABLET ORAL ONCE AS NEEDED
Status: DISCONTINUED | OUTPATIENT
Start: 2025-02-24 | End: 2025-02-24

## 2025-02-24 RX ORDER — CALCIUM CARBONATE 200(500)MG
500 TABLET,CHEWABLE ORAL 3 TIMES DAILY PRN
Status: DISCONTINUED | OUTPATIENT
Start: 2025-02-24 | End: 2025-02-24

## 2025-02-24 RX ORDER — OXYTOCIN-SODIUM CHLORIDE 0.9% IV SOLN 30 UNIT/500ML 30-0.9/5 UT/ML-%
30 SOLUTION INTRAVENOUS ONCE AS NEEDED
Status: DISCONTINUED | OUTPATIENT
Start: 2025-02-24 | End: 2025-02-24

## 2025-02-24 RX ORDER — OXYTOCIN-SODIUM CHLORIDE 0.9% IV SOLN 30 UNIT/500ML 30-0.9/5 UT/ML-%
95 SOLUTION INTRAVENOUS ONCE AS NEEDED
Status: DISCONTINUED | OUTPATIENT
Start: 2025-02-24 | End: 2025-02-24

## 2025-02-24 RX ORDER — ONDANSETRON 8 MG/1
8 TABLET, ORALLY DISINTEGRATING ORAL EVERY 8 HOURS PRN
Status: DISCONTINUED | OUTPATIENT
Start: 2025-02-24 | End: 2025-02-24

## 2025-02-24 RX ORDER — SODIUM CHLORIDE 9 MG/ML
INJECTION, SOLUTION INTRAVENOUS
Status: DISCONTINUED | OUTPATIENT
Start: 2025-02-24 | End: 2025-02-24

## 2025-02-24 RX ORDER — IBUPROFEN 600 MG/1
600 TABLET ORAL EVERY 6 HOURS
Status: DISCONTINUED | OUTPATIENT
Start: 2025-02-24 | End: 2025-02-26 | Stop reason: HOSPADM

## 2025-02-24 RX ORDER — DIPHENHYDRAMINE HYDROCHLORIDE 50 MG/ML
12.5 INJECTION INTRAMUSCULAR; INTRAVENOUS EVERY 4 HOURS PRN
Status: DISCONTINUED | OUTPATIENT
Start: 2025-02-24 | End: 2025-02-24

## 2025-02-24 RX ORDER — LIDOCAINE HYDROCHLORIDE 10 MG/ML
10 INJECTION, SOLUTION EPIDURAL; INFILTRATION; INTRACAUDAL; PERINEURAL ONCE AS NEEDED
Status: DISCONTINUED | OUTPATIENT
Start: 2025-02-24 | End: 2025-02-24

## 2025-02-24 RX ORDER — HYDROCORTISONE 25 MG/G
CREAM TOPICAL 3 TIMES DAILY PRN
Status: DISCONTINUED | OUTPATIENT
Start: 2025-02-24 | End: 2025-02-26 | Stop reason: HOSPADM

## 2025-02-24 RX ORDER — DIPHENOXYLATE HYDROCHLORIDE AND ATROPINE SULFATE 2.5; .025 MG/1; MG/1
2 TABLET ORAL EVERY 6 HOURS PRN
Status: DISCONTINUED | OUTPATIENT
Start: 2025-02-24 | End: 2025-02-24

## 2025-02-24 RX ORDER — OXYTOCIN-SODIUM CHLORIDE 0.9% IV SOLN 30 UNIT/500ML 30-0.9/5 UT/ML-%
0-32 SOLUTION INTRAVENOUS CONTINUOUS
Status: DISCONTINUED | OUTPATIENT
Start: 2025-02-24 | End: 2025-02-24

## 2025-02-24 RX ORDER — OXYTOCIN-SODIUM CHLORIDE 0.9% IV SOLN 30 UNIT/500ML 30-0.9/5 UT/ML-%
95 SOLUTION INTRAVENOUS ONCE AS NEEDED
Status: DISCONTINUED | OUTPATIENT
Start: 2025-02-24 | End: 2025-02-26 | Stop reason: HOSPADM

## 2025-02-24 RX ORDER — DIPHENHYDRAMINE HCL 25 MG
25 CAPSULE ORAL EVERY 4 HOURS PRN
Status: DISCONTINUED | OUTPATIENT
Start: 2025-02-24 | End: 2025-02-26 | Stop reason: HOSPADM

## 2025-02-24 RX ADMIN — FENTANYL CITRATE 50 MCG: 50 INJECTION INTRAMUSCULAR; INTRAVENOUS at 01:02

## 2025-02-24 RX ADMIN — ONDANSETRON 8 MG: 8 TABLET, ORALLY DISINTEGRATING ORAL at 01:02

## 2025-02-24 RX ADMIN — IBUPROFEN 600 MG: 600 TABLET, FILM COATED ORAL at 04:02

## 2025-02-24 RX ADMIN — HYDROCODONE BITARTRATE AND ACETAMINOPHEN 1 TABLET: 10; 325 TABLET ORAL at 06:02

## 2025-02-24 RX ADMIN — SODIUM CHLORIDE, POTASSIUM CHLORIDE, SODIUM LACTATE AND CALCIUM CHLORIDE: 600; 310; 30; 20 INJECTION, SOLUTION INTRAVENOUS at 08:02

## 2025-02-24 RX ADMIN — Medication 2 MILLI-UNITS/MIN: at 08:02

## 2025-02-24 NOTE — LACTATION NOTE
Lactation Rounds:    Mother verbalizes understanding of expected  behaviors and output for the first 48 hours of life.  Discussed the importance of cue based feedings on demand, unrestricted access to the breast, and frequent uninterrupted skin to skin contact.  Risk and implications of artificial nipples and non medically indicated formula supplementation discussed.        Mother reports breastfeeding is going really well. She is currently tandem nursing and has nursed other children for up to 3 years.     Mother reports she is not on any home medications at this time.     Mother has a breast pump for home use from her insurance company.     Mother denies any further lactation needs or concerns at this time. Encouraged mother to call for assistance when desired or when infant is showing signs of hunger. Mother verbalizes understanding of all education and counseling.    Discussed POC with Alexandra CARRIZALES RN
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
## 2025-02-24 NOTE — ANESTHESIA PREPROCEDURE EVALUATION
2025  Luisa Callaway is a 29 y.o., female.   at 37w6d for:     * Amniotic fluid leaking     HSV-2 seropositive     Major depressive disorder without psychotic features       Pre-op Assessment    I have reviewed the Patient Summary Reports.     I have reviewed the Nursing Notes.    I have reviewed the Medications.     Review of Systems  Anesthesia Hx:  No previous Anesthesia                Social:  Former Smoker       Hematology/Oncology:  Hematology Normal   Oncology Normal                                   EENT/Dental:  EENT/Dental Normal           Cardiovascular:  Exercise tolerance: good                NYHA Classification I                             Pulmonary:  Pulmonary Normal                       Renal/:  Renal/ Normal                 Hepatic/GI:  Hepatic/GI Normal                    Musculoskeletal:  Musculoskeletal Normal                Neurological:  Neurology Normal                                      Endocrine:  Endocrine Normal            Dermatological:  Skin Normal    Psych:   anxiety                 Physical Exam  General: Well nourished and Cooperative    Airway:  Mallampati: II   Mouth Opening: Normal  Tongue: Normal  Neck ROM: Normal ROM    Chest/Lungs:  Clear to auscultation, Normal Respiratory Rate    Heart:  Rate: Normal  Rhythm: Regular Rhythm        Anesthesia Plan  Type of Anesthesia, risks & benefits discussed:    Anesthesia Type: Epidural, CSE  Intra-op Monitoring Plan: Standard ASA Monitors  Post Op Pain Control Plan: epidural analgesia and multimodal analgesia  Airway Plan: , Post-Induction  Informed Consent: Informed consent signed with the Patient and all parties understand the risks and agree with anesthesia plan.  All questions answered. Patient consented to blood products? Yes  ASA Score: 2  Day of Surgery Review of History & Physical: H&P Update referred  to the surgeon/provider.    Ready For Surgery From Anesthesia Perspective.     .

## 2025-02-24 NOTE — L&D DELIVERY NOTE
"[facility] - [facility] Delivery  Vaginal Delivery   Operative Note    SUMMARY     Normal spontaneous vaginal delivery of live infant in birthing tub while on hands and knees, was placed on mothers abdomen for skin to skin and bulb suctioning performed.  Infant delivered position OA over intact perineum.  Nuchal cord: Yes, cord reduced following delivery.    Spontaneous delivery of placenta and  declines  pitocin given noting good uterine tone with minimal bleeding.  Small right hemostatic labial laceration, no repair needed .  Patient tolerated delivery well. Sponge needle and lap counted correctly x2. Mom and baby doing well.     Indications:  (spontaneous vaginal delivery)  Pregnancy complicated by: Problem List[1]  Admitting GA: 37w6d    Delivery Information for Darren Callaway    Birth information:  YOB: 2027   Time of birth: 2:29 PM   Sex: female   Head Delivery Date/Time: 2025  2:29 PM   Delivery type: Vaginal, Spontaneous   Gestational Age: 37w6d       Delivery Providers    Delivering clinician: Joanne Recio CNM   Provider Role    Alexandra Watson RN Registered Nurse    Rosalba Soares RN Registered Nurse    Roxie Manuel RN Registered Nurse              Measurements    Weight: 2810 g  Weight (lbs): 6 lb 3.1 oz  Length: 46.5 cm  Length (in): 18.31"  Head circumference: 33 cm  Chest circumference: 32 cm  Abdominal girth: 28 cm         Apgars    Living status: Living  Apgar Component Scores:  1 min.:  5 min.:  10 min.:  15 min.:  20 min.:    Skin color:  1  1       Heart rate:  2  2       Reflex irritability:  2  2       Muscle tone:  2  2       Respiratory effort:  2  2       Total:  9  9       Apgars assigned by: CARMEN WATSON RN         Operative Delivery    Forceps attempted?: No  Vacuum extractor attempted?: No         Shoulder Dystocia    Shoulder dystocia present?: No           Presentation    Presentation: Vertex  Position: Middle Occiput Anterior   "         Interventions/Resuscitation    Method: Bulb Suctioning, Tactile Stimulation       Cord    Vessels: 3 vessels  Complications: None  Delayed Cord Clamping?: Yes  Cord Clamped Date/Time: 2025  2:33 PM  Cord Blood Disposition: Lab  Gases Sent?: No  Stem Cell Collection (by MD): No       Placenta    Placenta delivery date/time: 2025 14:39  Placenta removal: Spontaneous  Placenta appearance: Intact  Placenta disposition: Family           Labor Events:       labor: No     Labor Onset Date/Time: 2025 00:34     Dilation Complete Date/Time: 2025 14:19     Start Pushing Date/Time: 2025 14:19       Start Pushing Date/Time: 2025 14:19     Rupture Date/Time: 25 0034        Rupture type: SRM (Spontaneous Rupture)        Fluid Amount:       Fluid Color: Clear              steroids: None     Antibiotics given for GBS: No     Induction:       Indications for induction:        Augmentation: oxytocin     Indications for augmentation: Ineffective Contraction Pattern     Labor complications: None     Additional complications:          Cervical ripening:                     Delivery:      Episiotomy: None     Indication for Episiotomy:       Perineal Lacerations: None Repaired:      Periurethral Laceration:   Repaired:     Labial Laceration: right Repaired: No   Sulcus Laceration:   Repaired:     Vaginal Laceration:   Repaired:     Cervical Laceration:   Repaired:     Repair suture: None     Repair # of packets: 0     Last Value - EBL - Nursing (mL):       Sum - EBL - Nursing (mL): 0     Last Value - EBL - Anesthesia (mL):      Calculated QBL (mL): 50     Running total QBL (mL): 50     Vaginal Sweep Performed: No     Surgicount Correct: Yes     Vaginal Packing: No Quantity:       Other providers:       Anesthesia    Method: None          Details (if applicable):  Trial of Labor      Categorization:      Priority:     Indications for :      Incision Type:       Additional  information:  Forceps:    Vacuum:    Breech:    Observed anomalies    Other (Comments):              [1]   Patient Active Problem List  Diagnosis    Cannabis dependence    Major depressive disorder without psychotic features    Post traumatic stress disorder (PTSD)    HSV-2 seropositive    Fibroid     (spontaneous vaginal delivery)    Single live birth    Obstetric labial laceration, delivered, current hospitalization

## 2025-02-24 NOTE — PROGRESS NOTES
LABOR NOTE    S:  Pt coping well, feeling more pressure with contractions.     O: /67   Pulse 88   Temp 98.1 °F (36.7 °C) (Oral)   Resp 19   SpO2 100%   Breastfeeding No     GENERAL: Calm and appropriate affect  NEURO: Alert, oriented, normal speech  ABDOMEN: Nontender, Fundus palpates soft between UC's.  FHT: Cat 1, reassuring.   CTX: q 1.5-5 minutes  SVE: /0      ASSESSMENT:   29 y.o.  IUP at 37w6d, FHT reassuring/ Cat 1    Problem List[1]      PLAN:    Pitocin turned off due to difficulty tracing FHR with frequent maternal movement & per pt request due to desire for NCB  Birth tub filling   Continue to monitor maternal/fetal status closely  Anticipate progress and vaginal birth               [1]   Patient Active Problem List  Diagnosis    Cannabis dependence    Major depressive disorder without psychotic features    Post traumatic stress disorder (PTSD)    HSV-2 seropositive    Fibroid     (spontaneous vaginal delivery)    Single live birth    Obstetric labial laceration, delivered, current hospitalization

## 2025-02-24 NOTE — SUBJECTIVE & OBJECTIVE
Obstetric HPI:  Patient reports Intensity: mild contractions, active fetal movement, No vaginal bleeding , Yes loss of fluid     This pregnancy has been complicated by   HSV2, valterx daily   Depression, no meds    OB History    Para Term  AB Living   4 3 3 0 0 3   SAB IAB Ectopic Multiple Live Births   0 0 0 0 3      # Outcome Date GA Lbr Isidro/2nd Weight Sex Type Anes PTL Lv   4 Current            3 Term 23 38w0d  2.68 kg (5 lb 14.5 oz) F Vag-Spont EPI N JOSE      Name: CURTIS VO      Apgar1: 9  Apgar5: 9   2 Term 16 38w0d  2.523 kg (5 lb 9 oz) F Vag-Spont EPI  JOSE   1 Term 09/20/15 38w0d  2.523 kg (5 lb 9 oz) F Vag-Spont EPI  JOSE     Past Medical History:   Diagnosis Date    Chlamydia     HSV-2 seropositive 2023    Major depressive disorder without psychotic features 2018    Post traumatic stress disorder (PTSD) 2018     Past Surgical History:   Procedure Laterality Date    TONSILLECTOMY         PTA Medications   Medication Sig    hydrOXYzine pamoate (VISTARIL) 25 MG Cap Take 1 capsule (25 mg total) by mouth every 6 (six) hours as needed (anxiety).    ondansetron (ZOFRAN-ODT) 4 MG TbDL Take 1 tablet (4 mg total) by mouth every 6 (six) hours as needed.    valACYclovir (VALTREX) 500 MG tablet Take 1 tablet (500 mg total) by mouth 2 (two) times daily.       Review of patient's allergies indicates:  No Known Allergies     Family History       Problem Relation (Age of Onset)    Diabetes Maternal Aunt    Hypertension Mother    Lung cancer Maternal Grandmother          Tobacco Use    Smoking status: Former    Smokeless tobacco: Never    Tobacco comments:     1/3 pack/day   Substance and Sexual Activity    Alcohol use: Not Currently    Drug use: Not Currently     Types: Marijuana, Hydrocodone     Comment: Marijuana and Norco Daily    Sexual activity: Yes     Partners: Male     Comment: ParaGard     Review of Systems   Gastrointestinal:  Negative for abdominal pain.    Genitourinary:  Positive for vaginal discharge. Negative for vaginal bleeding.   All other systems reviewed and are negative.     Objective:     Vital Signs (Most Recent):    Vital Signs (24h Range):           There is no height or weight on file to calculate BMI.    FHT: 135bpm with moderate variability and accelerations, no decelerations, Cat 1 (reassuring)  TOCO:  Q q8-14 minutes     Physical Exam:   Constitutional: She is oriented to person, place, and time. She appears well-developed and well-nourished.    HENT:   Head: Normocephalic.      Cardiovascular:  Normal rate.             Pulmonary/Chest: Effort normal.        Abdominal: Soft. There is no abdominal tenderness.     Genitourinary: There is vaginal discharge in the vagina.           Musculoskeletal: Normal range of motion and moves all extremeties.       Neurological: She is alert and oriented to person, place, and time.    Skin: Skin is warm and dry.    Psychiatric: She has a normal mood and affect. Her behavior is normal. Judgment and thought content normal.        Cervix: per RN  Dilation:  3.5cm  Effacement:  70  Station: -2  Presentation: Vertex     Significant Labs:  Lab Results   Component Value Date    GROUPTRH O POS 02/20/2025    HEPBSAG Non-reactive 10/24/2024    STREPBCULT No Group B Streptococcus isolated 06/20/2023       I have personallly reviewed all pertinent lab results from the last 24 hours.

## 2025-02-24 NOTE — TELEPHONE ENCOUNTER
Pt called unit with complaint of SROM this morning at 0034. States the fluid was clear and has been leaking since. Denies contractions. +FM. Reports some spotting, but no active bleeding. Pt advised to come to triage for further assessment.

## 2025-02-24 NOTE — ASSESSMENT & PLAN NOTE
Admit to L&D  Fern +, nitrazine +  Desires NCB/Tub  Discussed pitocin augmentation, pt in agreement  Aware no tub if NRFHTs or meconium  Continuous monitoring of maternal/fetal status  Anticipate progress and vaginal delivery

## 2025-02-24 NOTE — PROGRESS NOTES
[facility]  Obstetrics  Labor Progress Note    Patient Name: Luisa Callaway  MRN: [id number]  Admission Date: 2025  Hospital Length of Stay: 0 days  Attending Physician: Jazmin Ledezma,*  Primary Care Provider: Pao, Primary Doctor    Subjective:     Principal Problem:Amniotic fluid leaking    Hospital Course:  Admit to L&D  Fern +, nitrazine +  Desires NCB/Tub  Discussed pitocin augmentation, pt in agreement  Aware no tub if NRFHTs or meconium  Continuous monitoring of maternal/fetal status  Anticipate progress and vaginal delivery     25 08 Pitocin augmentation in progress, would like NCB.tub birth    Interval History:  Luisa is a 29 y.o.  at 37w6d. She is doing well. Reports mild contractions. Eating breakfast.     Objective:     Vital Signs (Most Recent):  Temp: 98.1 °F (36.7 °C) (25 0645)  Pulse: 76 (25 0645)  Resp: 16 (25 0645)  BP: 124/73 (25 0645)  SpO2: 100 % (25 0518) Vital Signs (24h Range):  Temp:  [98.1 °F (36.7 °C)] 98.1 °F (36.7 °C)  Pulse:  [76-80] 76  Resp:  [16] 16  SpO2:  [100 %] 100 %  BP: (114-124)/(73-78) 124/73        There is no height or weight on file to calculate BMI.    FHT: Cat 1 (reassuring)  TOCO:  Irregular     Cervical Exam:  Dilation:  Deferred for now     Significant Labs:  Lab Results   Component Value Date    GROUPTRH O POS 2025    HEPBSAG Non-reactive 10/24/2024    STREPBCULT No Group B Streptococcus isolated 2023       CBC:   Recent Labs   Lab 25   WBC 10.31   RBC 3.80*   HGB 12.4   HCT 36.8*   *   MCV 97   MCH 32.6*   MCHC 33.7     CMP:   Recent Labs   Lab 25   GLU 74   CALCIUM 8.7   ALBUMIN 3.2*   PROT 7.0   *   K 4.2   CO2 16*      BUN 5*   CREATININE 0.6   ALKPHOS 174*   ALT 5*   AST 15   BILITOT 0.4     HIV:   Recent Labs   Lab 25  0613   [id number] Negative       Physical Exam:   Constitutional: She is oriented to person, place, and time. She  appears well-developed and well-nourished.    HENT:   Head: Normocephalic and atraumatic.    Eyes: Conjunctivae and EOM are normal.     Cardiovascular:  Normal rate.             Pulmonary/Chest: Effort normal. No respiratory distress.        Abdominal: Soft. She exhibits no distension. There is no abdominal tenderness.     Genitourinary:    Vagina and uterus normal.             Musculoskeletal: Normal range of motion and moves all extremeties.       Neurological: She is alert and oriented to person, place, and time.    Skin: Skin is warm and dry.    Psychiatric: She has a normal mood and affect. Her behavior is normal. Judgment and thought content normal.       Review of Systems  Assessment/Plan:     29 y.o. female  at 37w6d for:    * Amniotic fluid leaking  Admit to L&D  Fern +, nitrazine +  Desires NCB/Tub  Discussed pitocin augmentation, pt in agreement  Aware no tub if NRFHTs or meconium  Continuous monitoring of maternal/fetal status  Anticipate progress and vaginal delivery     HSV-2 seropositive  Valtrex Daily  Negative on inspection     Major depressive disorder without psychotic features  No meds  Continue to monitor      Joanne Recio CNM  Obstetrics  [facility]

## 2025-02-24 NOTE — HOSPITAL COURSE
Admit to L&D  Fern +, nitrazine +  Desires NCB/Tub  Discussed pitocin augmentation, pt in agreement  Aware no tub if NRFHTs or meconium  Continuous monitoring of maternal/fetal status  Anticipate progress and vaginal delivery     2/24/25 0825 Pitocin augmentation in progress, would like NCB.tub birth    2/25/25: Pt doing well, no complains. Routine pp orders.  2/26/25:PPD2, doing really well. Ready for d/c. Discussed PPD in depth. Not currently on medications.

## 2025-02-24 NOTE — SUBJECTIVE & OBJECTIVE
Interval History:  Luisa is a 29 y.o.  at 37w6d. She is doing well. Reports mild contractions. Eating breakfast.     Objective:     Vital Signs (Most Recent):  Temp: 98.1 °F (36.7 °C) (25 0645)  Pulse: 76 (25 0645)  Resp: 16 (25 0645)  BP: 124/73 (25 0645)  SpO2: 100 % (25 0518) Vital Signs (24h Range):  Temp:  [98.1 °F (36.7 °C)] 98.1 °F (36.7 °C)  Pulse:  [76-80] 76  Resp:  [16] 16  SpO2:  [100 %] 100 %  BP: (114-124)/(73-78) 124/73        There is no height or weight on file to calculate BMI.    FHT: Cat 1 (reassuring)  TOCO:  Irregular     Cervical Exam:  Dilation:  Deferred for now     Significant Labs:  Lab Results   Component Value Date    GROUPTRH O POS 2025    HEPBSAG Non-reactive 10/24/2024    STREPBCULT No Group B Streptococcus isolated 2023       CBC:   Recent Labs   Lab 25  0613   WBC 10.31   RBC 3.80*   HGB 12.4   HCT 36.8*   *   MCV 97   MCH 32.6*   MCHC 33.7     CMP:   Recent Labs   Lab 25  0613   GLU 74   CALCIUM 8.7   ALBUMIN 3.2*   PROT 7.0   *   K 4.2   CO2 16*      BUN 5*   CREATININE 0.6   ALKPHOS 174*   ALT 5*   AST 15   BILITOT 0.4     HIV:   Recent Labs   Lab 25  0613   [id number] Negative       Physical Exam:   Constitutional: She is oriented to person, place, and time. She appears well-developed and well-nourished.    HENT:   Head: Normocephalic and atraumatic.    Eyes: Conjunctivae and EOM are normal.     Cardiovascular:  Normal rate.             Pulmonary/Chest: Effort normal. No respiratory distress.        Abdominal: Soft. She exhibits no distension. There is no abdominal tenderness.     Genitourinary:    Vagina and uterus normal.             Musculoskeletal: Normal range of motion and moves all extremeties.       Neurological: She is alert and oriented to person, place, and time.    Skin: Skin is warm and dry.    Psychiatric: She has a normal mood and affect. Her behavior is normal. Judgment and  thought content normal.       Review of Systems

## 2025-02-24 NOTE — H&P
[facility]  Obstetrics  History & Physical    Patient Name: Luisa Callaway  MRN: [id number]  Admission Date: 2025  Primary Care Provider: No, Primary Doctor    Subjective:     Principal Problem:Amniotic fluid leaking    History of Present Illness:  Presents to L&D reporting leaking at midnight, good FM, denies VB    Obstetric HPI:  Patient reports Intensity: mild contractions, active fetal movement, No vaginal bleeding , Yes loss of fluid     This pregnancy has been complicated by   HSV2, valterx daily   Depression, no meds    OB History    Para Term  AB Living   4 3 3 0 0 3   SAB IAB Ectopic Multiple Live Births   0 0 0 0 3      # Outcome Date GA Lbr Isidro/2nd Weight Sex Type Anes PTL Lv   4 Current            3 Term 23 38w0d  2.68 kg (5 lb 14.5 oz) F Vag-Spont EPI N JOSE      Name: GILDA,CURTIS SCHULER      Apgar1: 9  Apgar5: 9   2 Term 16 38w0d  2.523 kg (5 lb 9 oz) F Vag-Spont EPI  JOSE   1 Term 09/20/15 38w0d  2.523 kg (5 lb 9 oz) F Vag-Spont EPI  JOSE     Past Medical History:   Diagnosis Date    Chlamydia     HSV-2 seropositive 2023    Major depressive disorder without psychotic features 2018    Post traumatic stress disorder (PTSD) 2018     Past Surgical History:   Procedure Laterality Date    TONSILLECTOMY         PTA Medications   Medication Sig    hydrOXYzine pamoate (VISTARIL) 25 MG Cap Take 1 capsule (25 mg total) by mouth every 6 (six) hours as needed (anxiety).    ondansetron (ZOFRAN-ODT) 4 MG TbDL Take 1 tablet (4 mg total) by mouth every 6 (six) hours as needed.    valACYclovir (VALTREX) 500 MG tablet Take 1 tablet (500 mg total) by mouth 2 (two) times daily.       Review of patient's allergies indicates:  No Known Allergies     Family History       Problem Relation (Age of Onset)    Diabetes Maternal Aunt    Hypertension Mother    Lung cancer Maternal Grandmother          Tobacco Use    Smoking status: Former    Smokeless tobacco: Never     Tobacco comments:     1/3 pack/day   Substance and Sexual Activity    Alcohol use: Not Currently    Drug use: Not Currently     Types: Marijuana, Hydrocodone     Comment: Marijuana and Norco Daily    Sexual activity: Yes     Partners: Male     Comment: ParaGard     Review of Systems   Gastrointestinal:  Negative for abdominal pain.   Genitourinary:  Positive for vaginal discharge. Negative for vaginal bleeding.   All other systems reviewed and are negative.     Objective:     Vital Signs (Most Recent):    Vital Signs (24h Range):           There is no height or weight on file to calculate BMI.    FHT: 135bpm with moderate variability and accelerations, no decelerations, Cat 1 (reassuring)  TOCO:  Q q8-14 minutes     Physical Exam:   Constitutional: She is oriented to person, place, and time. She appears well-developed and well-nourished.    HENT:   Head: Normocephalic.      Cardiovascular:  Normal rate.             Pulmonary/Chest: Effort normal.        Abdominal: Soft. There is no abdominal tenderness.     Genitourinary: There is vaginal discharge in the vagina.           Musculoskeletal: Normal range of motion and moves all extremeties.       Neurological: She is alert and oriented to person, place, and time.    Skin: Skin is warm and dry.    Psychiatric: She has a normal mood and affect. Her behavior is normal. Judgment and thought content normal.        Cervix: per RN  Dilation:  3.5cm  Effacement:  70  Station: -2  Presentation: Vertex     Significant Labs:  Lab Results   Component Value Date    GROUPTRH O POS 2025    HEPBSAG Non-reactive 10/24/2024    STREPBCULT No Group B Streptococcus isolated 2023       I have personallly reviewed all pertinent lab results from the last 24 hours.  Assessment/Plan:     29 y.o. female  at 37w6d for:    * Amniotic fluid leaking  Admit to L&D  Fern +, nitrazine +  Desires NCB/Tub  Discussed pitocin augmentation, pt in agreement  Aware no tub if NRFHTs or  meconium  Continuous monitoring of maternal/fetal status  Anticipate progress and vaginal delivery     HSV-2 seropositive  Valtrex Daily  Negative on inspection     Major depressive disorder without psychotic features  No meds  Continue to monitor        Mike Waller CNM  Obstetrics  [facility]

## 2025-02-25 PROCEDURE — 25000003 PHARM REV CODE 250: Performed by: MIDWIFE

## 2025-02-25 PROCEDURE — 99231 SBSQ HOSP IP/OBS SF/LOW 25: CPT | Mod: ,,, | Performed by: MIDWIFE

## 2025-02-25 PROCEDURE — 11000001 HC ACUTE MED/SURG PRIVATE ROOM

## 2025-02-25 RX ADMIN — IBUPROFEN 600 MG: 600 TABLET, FILM COATED ORAL at 11:02

## 2025-02-25 RX ADMIN — IBUPROFEN 600 MG: 600 TABLET, FILM COATED ORAL at 12:02

## 2025-02-25 RX ADMIN — IBUPROFEN 600 MG: 600 TABLET, FILM COATED ORAL at 05:02

## 2025-02-25 RX ADMIN — IBUPROFEN 600 MG: 600 TABLET, FILM COATED ORAL at 06:02

## 2025-02-25 NOTE — PLAN OF CARE
[facility])  OB Initial Discharge Assessment       Primary Care Provider: No, Primary Doctor    Expected Discharge Date: 3/11/2025    Initial Assessment (most recent)       OB Discharge Planning Assessment - 25 1508          OB Discharge Planning Assessment    Assessment Type Discharge Planning Assessment     Source of Information patient     Verified Demographic and Insurance Information Yes     Insurance Medicaid     Medicaid [facility]     Medicaid Insurance Primary     Spiritual Affiliation Other      Contact Status none needed     People in Home child(sharath), dependent     Name(s) of People in Home Lazaro Kang (9), Mark Nicholson (8), Phoenix Lair (19 month)     Number people in home 5 total with  and mother     Relationship Status None     Name of Support/Comfort Primary Source Caren Ferris ( [phone]) and Karen Shukla ([phone])     Other children (include names and ages) Lazaro Kang (9), Mark Nicholson (8), Phoenix Lair (19 month)     Employed Full Time     Employer [employer]     Job Title [employer]     Currently Enrolled in School Yes     Highest Level of Education Some College     Father's Involvement None     Family Involvement Minimal     Primary Contact Name and Number Caren Ferris ( [phone]) and Kraen Shukla ([phone]) (both friends)     Received Prenatal Care Yes     Transportation Anticipated family or friend will provide     Receive WIC Benefits Already certified, will apply for new born      Arrangements Self;Day Care     Adoption Planned no     Infant Feeding Plan breastfeeding     Previous Breastfeeding Experience yes     Does baby have crib or safe sleep space? Yes     Do you have a car seat? Yes     Has other essential care items? Diapers;Bottles;Clothing     Resource/Environmental Concerns none     Equipment Currently Used at Home none     DME Needed Upon Discharge  none     DCFS  "Notified     DCFS Notified Verbal report given to Lidya @ [id number]; Online report: [id number]     Discharge Plan A Home;Home with family     Do you have any problems affording any of your prescribed medications? No                   Psychosocial (most recent)       OB Psychosocial Assessment - 02/25/25 1517          OB Psychosocial Assessment    Current or Previous  Service none     Anxieties, Fears or Concerns None expressed     Major Change/Loss/Stressor/Fears denies     Feels Unsafe at Home or Work/School no     Feels Threatened by Someone no     Does anyone try to keep you from having contact with others or doing things outside your home? no     Physical Signs of Abuse Present no     Have You Felt Down, Depressed or Hopeless? no     Have You Felt Little Interest or Pleasure in Doing Things? no     Feels Like Hurting Self None     Feels Like Hurting Others no     Have you ever experienced a traumatic event? no     Have you ever witnessed a violent act? no     Have you ever experienced a life threatening injury or near death encounter? no     Current/Active Substance Abuse Yes     Substances THC     Current/Active Behavioral Health Issues No                   SW met with patient and baby, Bolivar Callaway, at bedside for assessment. SW role explained. Mother was easy to engage and appropriate throughout meeting; baby noted to be laying on mother's chest.   Mother confirmed demographic information and [place] address. Mother, new born and 3 other minor children live in the home. Mother reports having all necessary care items for baby: crib, bottles, diapers, and car seat. DC transportation will likely be her friend, . Mother reports the father of baby is not involved and will not be signing birth certificate - no contact information for father available at this time.   SW discussed substance use history and positive THC UDS for baby. Mother admits to use 2 weeks prior to delivery. Reason for use was d/t "nausea " "and anxiety." Mother reports prior involvement with DCFS and has a . Mandating reporting policy explained. Mother voiced understanding. No additional questions/concerns at this time.     Pt's whiteboard updated with CM contact and anticipated discharge disposition. SW to remain available as needed.                "

## 2025-02-25 NOTE — LACTATION NOTE
Lactation Rounds:   Mother reports that breastfeeding is going well. She is currently tandem nursing her 20 months old daughter. She reports that she has received a new Motif breast pump from her insurance provider and a Momcozy pump for home use.     Mother is currently feeding her baby on the right breast using a cross cradle hold. Infant is actively feeding. Audible swallows noted and mother denies any pain or concerns at this time. Praised mother's efforts and encouraged her to call for lactation assistance as needed. Mother verbalizes understanding.

## 2025-02-25 NOTE — SUBJECTIVE & OBJECTIVE
Interval History:     She is doing well this morning. She is tolerating a regular diet without nausea or vomiting. She is voiding spontaneously. She is ambulating. She has passed flatus, and has not a BM. Vaginal bleeding is mild. She denies fever or chills. Abdominal pain is mild and controlled with oral medications. She Is breastfeeding. She desires circumcision for her male baby: not applicable.    Objective:     Vital Signs (Most Recent):  Temp: 98 °F (36.7 °C) (02/25/25 0808)  Pulse: 74 (02/25/25 0808)  Resp: 18 (02/25/25 0808)  BP: 111/63 (02/25/25 0808)  SpO2: 99 % (02/25/25 0407) Vital Signs (24h Range):  Temp:  [97.4 °F (36.3 °C)-98.4 °F (36.9 °C)] 98 °F (36.7 °C)  Pulse:  [61-89] 74  Resp:  [14-19] 18  SpO2:  [99 %-100 %] 99 %  BP: (103-136)/(58-81) 111/63        There is no height or weight on file to calculate BMI.      Intake/Output Summary (Last 24 hours) at 2/25/2025 0956  Last data filed at 2/25/2025 0015  Gross per 24 hour   Intake 665 ml   Output 720 ml   Net -55 ml         Significant Labs:  Lab Results   Component Value Date    GROUPTRH O POS 02/24/2025    HEPBSAG Non-reactive 10/24/2024    STREPBCULT No Group B Streptococcus isolated 06/20/2023     Recent Labs   Lab 02/24/25  0613   HGB 12.4   HCT 36.8*       I have personallly reviewed all pertinent lab results from the last 24 hours.    Physical Exam:   Constitutional: She is oriented to person, place, and time. She appears well-developed and well-nourished.    HENT:   Head: Normocephalic.    Eyes: Conjunctivae are normal.      Pulmonary/Chest: Effort normal.                  Musculoskeletal: Normal range of motion.       Neurological: She is alert and oriented to person, place, and time.    Skin: Skin is warm and dry.    Psychiatric: She has a normal mood and affect. Her behavior is normal. Judgment and thought content normal.       Review of Systems   Gastrointestinal:  Positive for abdominal pain.   Genitourinary:  Positive for vaginal  bleeding.   Musculoskeletal:  Positive for back pain.

## 2025-02-25 NOTE — PROGRESS NOTES
[facility] ([facility])  Obstetrics  Postpartum Progress Note    Patient Name: Luisa Callaway  MRN: [id number]  Admission Date: 2025  Hospital Length of Stay: 1 days  Attending Physician: Jazmin Ledezma,*  Primary Care Provider: Pao, Primary Doctor    Subjective:     Principal Problem: (spontaneous vaginal delivery)    Hospital Course:  Admit to L&D  Fern +, nitrazine +  Desires NCB/Tub  Discussed pitocin augmentation, pt in agreement  Aware no tub if NRFHTs or meconium  Continuous monitoring of maternal/fetal status  Anticipate progress and vaginal delivery     25 Pitocin augmentation in progress, would like NCB.tub birth    25: Pt doing well, no complains. Routine pp orders.    Interval History:     She is doing well this morning. She is tolerating a regular diet without nausea or vomiting. She is voiding spontaneously. She is ambulating. She has passed flatus, and has not a BM. Vaginal bleeding is mild. She denies fever or chills. Abdominal pain is mild and controlled with oral medications. She Is breastfeeding. She desires circumcision for her male baby: not applicable.    Objective:     Vital Signs (Most Recent):  Temp: 98 °F (36.7 °C) (25 0808)  Pulse: 74 (25 0808)  Resp: 18 (25 0808)  BP: 111/63 (25 0808)  SpO2: 99 % (25 0407) Vital Signs (24h Range):  Temp:  [97.4 °F (36.3 °C)-98.4 °F (36.9 °C)] 98 °F (36.7 °C)  Pulse:  [61-89] 74  Resp:  [14-19] 18  SpO2:  [99 %-100 %] 99 %  BP: (103-136)/(58-81) 111/63        There is no height or weight on file to calculate BMI.      Intake/Output Summary (Last 24 hours) at 2025 0956  Last data filed at 2025 0015  Gross per 24 hour   Intake 665 ml   Output 720 ml   Net -55 ml         Significant Labs:  Lab Results   Component Value Date    GROUPTRH O POS 2025    HEPBSAG Non-reactive 10/24/2024    STREPBCULT No Group B Streptococcus isolated 2023     Recent Labs   Lab 25  0613    HGB 12.4   HCT 36.8*       I have personallly reviewed all pertinent lab results from the last 24 hours.    Physical Exam:   Constitutional: She is oriented to person, place, and time. She appears well-developed and well-nourished.    HENT:   Head: Normocephalic.    Eyes: Conjunctivae are normal.      Pulmonary/Chest: Effort normal.                  Musculoskeletal: Normal range of motion.       Neurological: She is alert and oriented to person, place, and time.    Skin: Skin is warm and dry.    Psychiatric: She has a normal mood and affect. Her behavior is normal. Judgment and thought content normal.       Review of Systems   Gastrointestinal:  Positive for abdominal pain.   Genitourinary:  Positive for vaginal bleeding.   Musculoskeletal:  Positive for back pain.     Assessment/Plan:     29 y.o. female  for:    *  (spontaneous vaginal delivery)  Routine pp care    Obstetric labial laceration, delivered, current hospitalization  Routine perineal care    Single live birth  In care of peds    HSV-2 seropositive  Valtrex Daily  Negative on inspection      Major depressive disorder without psychotic features  No meds  Continue to monitor         Disposition: As patient meets milestones, will plan to discharge tomorrow.    Yessenia Servin CNM  Obstetrics  [facility] ([facility])    SAQIB Good

## 2025-02-25 NOTE — LACTATION NOTE
Called to bedside for latch check. Mother had baby latched on left breast in cross cradle position. Adjusted position by placing pillow under baby. Encouraged mother to keep baby close when feeding and use breast compressions. Baby fed until content and self-detached. No nipple compression, mother denies soreness or pain. Reviewed asymmetric latch technique. Mother is planning to tandem feed at home with 19 month old. Good output today, mother states baby has had a wet diaper with every feeding, about 5 wets and 2 stools recorded on feeding sheet.     Mother verbalizes understanding of all education and counseling. Mother denies any further lactation needs or concerns at this time. Discussed lactation availability. Encouraged mother to call for assistance when needs arise.

## 2025-02-25 NOTE — PLAN OF CARE
Patient afebrile and had no falls this shift. Fundus firm without massage and below umbilicus. Bleeding light, no clots passed this shift. Voids spontaneously. Ambulates independently. Pain well controlled with oral pain medication. Vital signs stable at this time. Bonding well with infant; responds to infant cues and participates in infant care. Will continue to monitor.      Problem: Adult Inpatient Plan of Care  Goal: Plan of Care Review  Outcome: Progressing  Goal: Patient-Specific Goal (Individualized)  Outcome: Progressing  Goal: Absence of Hospital-Acquired Illness or Injury  Outcome: Progressing  Goal: Optimal Comfort and Wellbeing  Outcome: Progressing  Goal: Readiness for Transition of Care  Outcome: Progressing    Problem:  Fall Injury Risk  Goal: Absence of Fall, Infant Drop and Related Injury  Outcome: Progressing     Problem: Infection  Goal: Absence of Infection Signs and Symptoms  Outcome: Progressing     Problem: Postpartum (Vaginal Delivery)  Goal: Successful Parent Role Transition  Outcome: Progressing  Goal: Hemostasis  Outcome: Progressing  Goal: Absence of Infection Signs and Symptoms  Outcome: Progressing  Goal: Anesthesia/Sedation Recovery  Outcome: Progressing  Goal: Optimal Pain Control and Function  Outcome: Progressing  Goal: Effective Urinary Elimination  Outcome: Progressing     Problem: Breastfeeding  Goal: Effective Breastfeeding  Outcome: Progressing

## 2025-02-25 NOTE — LACTATION NOTE
Lactation rounds: Visited patient at bedside and patient asked to call lactation nurse for next feeding. Lactation consultant name left on whiteboard. Patients states breastfeeding is going well but baby is already cluster-feeding. Discussed infant's early term status and informed patient we will be keeping a close watch on output and weight loss and will assist patient in next steps if needed. Patient verbalized understanding.

## 2025-02-26 ENCOUNTER — PATIENT MESSAGE (OUTPATIENT)
Dept: ADMINISTRATIVE | Facility: OTHER | Age: 30
End: 2025-02-26
Payer: MEDICAID

## 2025-02-26 VITALS
OXYGEN SATURATION: 100 % | HEART RATE: 74 BPM | DIASTOLIC BLOOD PRESSURE: 77 MMHG | TEMPERATURE: 98 F | SYSTOLIC BLOOD PRESSURE: 113 MMHG | RESPIRATION RATE: 18 BRPM

## 2025-02-26 PROCEDURE — 25000003 PHARM REV CODE 250: Performed by: MIDWIFE

## 2025-02-26 PROCEDURE — 99238 HOSP IP/OBS DSCHRG MGMT 30/<: CPT | Mod: UC,,, | Performed by: ADVANCED PRACTICE MIDWIFE

## 2025-02-26 RX ORDER — IBUPROFEN 600 MG/1
600 TABLET ORAL EVERY 6 HOURS PRN
Qty: 30 TABLET | Refills: 3 | Status: SHIPPED | OUTPATIENT
Start: 2025-02-26

## 2025-02-26 RX ADMIN — IBUPROFEN 600 MG: 600 TABLET, FILM COATED ORAL at 05:02

## 2025-02-26 NOTE — PLAN OF CARE
AVS sheet reviewed. Educated on self care, follow-up appointments, and signs/symptoms of pre-eclampsia. Patient verbalizes understanding.       Problem: Adult Inpatient Plan of Care  Goal: Plan of Care Review  Outcome: Met  Goal: Patient-Specific Goal (Individualized)  Outcome: Met  Goal: Absence of Hospital-Acquired Illness or Injury  Outcome: Met  Goal: Optimal Comfort and Wellbeing  Outcome: Met  Goal: Readiness for Transition of Care  Outcome: Met     Problem:  Fall Injury Risk  Goal: Absence of Fall, Infant Drop and Related Injury  Outcome: Met     Problem: Infection  Goal: Absence of Infection Signs and Symptoms  Outcome: Met     Problem: Postpartum (Vaginal Delivery)  Goal: Successful Parent Role Transition  Outcome: Met  Goal: Hemostasis  Outcome: Met  Goal: Absence of Infection Signs and Symptoms  Outcome: Met  Goal: Anesthesia/Sedation Recovery  Outcome: Met  Goal: Optimal Pain Control and Function  Outcome: Met  Goal: Effective Urinary Elimination  Outcome: Met     Problem: Breastfeeding  Goal: Effective Breastfeeding  Outcome: Met

## 2025-02-26 NOTE — DISCHARGE SUMMARY
[facility] ([facility])  Obstetrics  Discharge Summary      Patient Name: Luisa Callwaay  MRN: [id number]  Admission Date: 2025  Hospital Length of Stay: 2 days  Discharge Date and Time: No discharge date for patient encounter.  Attending Physician: Jazmin Ledezma,*   Discharging Provider: Nikkie Pantoja CNM   Primary Care Provider: Pao, Primary Doctor    HPI: Presents to L&D reporting leaking at midnight, good FM, denies VB        * No surgery found *     Hospital Course:   Admit to L&D  Fern +, nitrazine +  Desires NCB/Tub  Discussed pitocin augmentation, pt in agreement  Aware no tub if NRFHTs or meconium  Continuous monitoring of maternal/fetal status  Anticipate progress and vaginal delivery     25 0825 Pitocin augmentation in progress, would like NCB.tub birth    25: Pt doing well, no complains. Routine pp orders.  25:PPD2, doing really well. Ready for d/c. Discussed PPD in depth. Not currently on medications.          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 Yes    Major depressive disorder without psychotic features [F32.9] 2018 Yes      Problems Resolved During this Admission:    Diagnosis Date Noted Date Resolved POA    Amniotic fluid leaking [O42.90] 2025 Yes        Significant Diagnostic Studies: Labs: All labs within the past 24 hours have been reviewed      Feeding Method: breast    Immunizations       Date Immunization Status Dose Route/Site Given by    25 1611 MMR Incomplete 0.5 mL Subcutaneous/     25 1611 Tdap Incomplete 0.5 mL Intramuscular/             Delivery:    Episiotomy: None   Lacerations: None   Repair suture: None   Repair # of  "packets: 0   Blood loss (ml):       Birth information:  YOB: 2027   Time of birth: 2:29 PM   Sex: female   Delivery type: Vaginal, Spontaneous   Gestational Age: 37w6d     Measurements    Weight: 2810 g  Weight (lbs): 6 lb 3.1 oz  Length: 46.5 cm  Length (in): 18.31"  Head circumference: 33 cm  Chest circumference: 32 cm  Abdominal girth: 28 cm         Delivery Clinician: Delivery Providers    Delivering clinician: Joanne Recio CNM   Provider Role    Alexandra Watson, RN Registered Nurse    Rosalba Soares, RN Registered Nurse    Roxie Manuel RN Registered Nurse             Additional  information:  Forceps:    Vacuum:    Breech:    Observed anomalies      Living?:     Apgars    Living status: Living  Apgar Component Scores:  1 min.:  5 min.:  10 min.:  15 min.:  20 min.:    Skin color:  1  1       Heart rate:  2  2       Reflex irritability:  2  2       Muscle tone:  2  2       Respiratory effort:  2  2       Total:  9  9       Apgars assigned by: CARMEN WATSON RN         Placenta: Delivered:       appearance  Pending Diagnostic Studies:       None            Discharged Condition: stable    Disposition: Home or Self Care    Follow Up:   Follow-up Information       Quin Cameron CNM Follow up in 4 week(s).    Specialty: Obstetrics and Gynecology  Why: postpartum visit  Contact information:  [address]  [place] [address] [phone]                           Patient Instructions:      Notify your health care provider if you experience any of the following:  temperature >100.4     Notify your health care provider if you experience any of the following:  persistent nausea and vomiting or diarrhea     Notify your health care provider if you experience any of the following:  severe uncontrolled pain     Notify your health care provider if you experience any of the following:  redness, tenderness, or signs of infection (pain, swelling, redness, odor or green/yellow discharge around " incision site)     Medications:  Current Discharge Medication List        START taking these medications    Details   ibuprofen (ADVIL,MOTRIN) 600 MG tablet Take 1 tablet (600 mg total) by mouth every 6 (six) hours as needed for Pain.  Qty: 30 tablet, Refills: 3           CONTINUE these medications which have NOT CHANGED    Details   hydrOXYzine pamoate (VISTARIL) 25 MG Cap Take 1 capsule (25 mg total) by mouth every 6 (six) hours as needed (anxiety).  Qty: 60 capsule, Refills: 6    Associated Diagnoses: Major depressive disorder without psychotic features           STOP taking these medications       valACYclovir (VALTREX) 500 MG tablet Comments:   Reason for Stopping:               Nikkie Pantoja CNM  Obstetrics  [facility] ([facility])

## 2025-02-26 NOTE — LACTATION NOTE
Lactation rounds- Mother walking around room about to take a shower. Infant asleep in bassinet and swaddled. Mother states nursing continues going well and has no concerns. Output and weight loss WNL.     Mother anticipates discharge home today. Reviewed signs of good attachment. Reviewed breast massage and compression during feedings and indications for use. Reviewed signs of effective milk transfer and instructed to call pediatrician and lactation if signs not present. Discussed expected feeding and output pattern for days of life 2, 3, 4, & 5+; mother instructed to call pediatrician and lactation if infant is not meeting feeding and output goals.     Reviewed signs of engorgement and expectant management. Reviewed signs of mastitis and instructed mother to call OB provider and lactation if any signs present. Discussed proper use of First Alert Form. Reviewed proper milk handling, collection and storage guidelines. Reviewed nursing diet and nutrition. Discussed resources for medication safety while breastfeeding. Reviewed available outpatient lactation resources.     Mother verbalizes understanding of all education and counseling; she denies any further lactation needs or concerns at this time. Encouraged mother to contact lactation with any questions, concerns, or problems, contact number provided.    Primary nurse, roger Lara.

## 2025-02-26 NOTE — PLAN OF CARE
Patient afebrile and had no falls this shift. Fundus firm without massage and below umbilicus. Bleeding light, no clots passed this shift. Voids spontaneously. Ambulates independently. Pain well controlled with oral pain medication. Vital signs stable at this time. Bonding well with infant; responds to infant cues and participates in infant care. Call light placed within reach; encouraged use if needed.

## 2025-02-26 NOTE — PLAN OF CARE
[phone][facility] was accepted on yesterday,  baby is cleared for discharge.     [facility]  CHAO Baker will meet with pt at home.

## 2025-02-26 NOTE — DISCHARGE INSTRUCTIONS
"Mother Self Care:    Activity: Avoid strenuous exercise and get adequate rest.  No driving until the physician consent given.  Emotional Changes: Most women find birth to be a time of great emotional upheaval.  Sense of loss, mood swings, fatigue, anxiety, and feeling "let down" are common.  If feelings worsen or last more than a week, call your physician.  Breast Care/Breastfeeding: Wear a bra for comfort.  Keep nipples dry and apply your own breast milk or lanolin cream as needed for soreness.  Engorgement can be relieved with warm, moist heat before feedings.  You may also take Ibuprofen.    Neris-Care/Vaginal Bleeding: Remember to use your neris-bottle after urinating.  Your flow will change from red, to pink, to yellow/white color over a period of 2 weeks.  Menstruation will return in 3-8 weeks, or longer if breastfeeding.  Episiotomy Vaginal Delivery: Stitches will dissolve within 10 days to 3 weeks.  Warm baths, tucks, and dermoplast will promote healing.  Avoid bubble baths or strong soaps.  Sexual Activity/Pelvic Rest: No sexual activity, tampons, or douching until your physician gives you consent.  Diet: Continue to eat from the five basic food groups, including plenty of protein, fruits, vegetables, and whole grains.  Limit empty calories and high fat foods.  Drink enough fluids to satisfy thirst and add an extra 500 calories for breastfeeding.  Constipation/Hemorrhoids: Drink plenty of water.  You may take a stool softener or natural laxative (Metamucil). You may use tucks or hemorrhoid ointment and soak in a warm tub.    "What does help look like to you when you go home?"  "Is there any need that you anticipate that we may be able to assist with?"    CALL YOUR OB DOCTOR IF ANY OF THE FOLLOWING OCCURS:  *Heavy bleeding - saturating a pad an hour or passing any large (2-3 inches in size) blood clots.  *Any pain, redness, or tenderness in lower leg.  *You cannot care for yourself or your baby.  *Any signs of " infection-      - Temperature greater than 100.5 degrees F      - Foul smelling vaginal discharge and/or incisional drainage      - Increased episiotomy or incisional pain      - Hot, hard, red or sore area on breast      - Flu-like symptoms      - Any urgency, frequency or burning with urination    Return To the Hospital for further Evaluation:  Headache not relieved by tylenol or ibuprofen  Blurry vision, double vision, seeing spots, or flashing lights  Feeling faint or passing out  Right epigastric pain  Difficulty breathing  Swelling in hands, face, or feet  Any of these symptoms accompanied by nausea/vomiting  Gaining more than 5 pounds in one week  Seizures  These symptoms could be an indication of elevated blood pressure.     For patients that were treated for high blood pressure during pregnancy and or your hospital stay you will need a blood pressure check three days after you leave the hospital. Your nursing staff will assist you in an appointment if needed. If you have Connected Mom you may use your blood pressure cuff and report any readings 140/90 to your provider immediately.       If you have any questions that need to be answered immediately please call the Labor & Delivery Unit at [phone] and ask to speak to a nurse.      Please see [facility] folder for additional information and handouts.

## 2025-04-04 ENCOUNTER — POSTPARTUM VISIT (OUTPATIENT)
Dept: OBSTETRICS AND GYNECOLOGY | Facility: CLINIC | Age: 30
End: 2025-04-04
Payer: MEDICAID

## 2025-04-04 VITALS
BODY MASS INDEX: 34 KG/M2 | DIASTOLIC BLOOD PRESSURE: 68 MMHG | HEIGHT: 62 IN | SYSTOLIC BLOOD PRESSURE: 116 MMHG | WEIGHT: 184.75 LBS

## 2025-04-04 DIAGNOSIS — Z30.018 ENCOUNTER FOR INITIAL PRESCRIPTION OF OTHER CONTRACEPTIVES: ICD-10-CM

## 2025-04-04 PROCEDURE — 99213 OFFICE O/P EST LOW 20 MIN: CPT | Mod: PBBFAC

## 2025-04-04 PROCEDURE — 99999 PR PBB SHADOW E&M-EST. PATIENT-LVL III: CPT | Mod: PBBFAC,,,

## 2025-04-04 NOTE — PROGRESS NOTES
"Subjective:       Luisa Callaway is a 29 y.o. female who presents for a postpartum visit. She is 5 weeks postpartum following a spontaneous vaginal delivery. I have fully reviewed the prenatal and intrapartum course. The delivery was at 37.6 gestational weeks. Outcome: spontaneous vaginal delivery. Anesthesia: none. Postpartum course has been complicated by excessive sweating. Discussed hormonal changes during postpartal time. Will likely resolve on its own. Patient verbalized understanding  . Baby's course has been uncomplicated. Baby is feeding by breast. Bleeding no bleeding. Bowel function is normal. Bladder function is normal. Patient is not sexually active. Contraception method is none. Postpartum depression screening: negative.    The following portions of the patient's history were reviewed and updated as appropriate: allergies, current medications, past family history, past medical history, past social history, past surgical history, and problem list.    Review of Systems  Pertinent items are noted in HPI.     Objective:      /68 (Patient Position: Sitting)   Ht 5' 2" (1.575 m)   Wt 83.8 kg (184 lb 11.9 oz)   Breastfeeding Yes   BMI 33.79 kg/m²    General:  alert, appears stated age, and cooperative                                                  Assessment:      routine postpartum exam. Pap smear not done at today's visit.     Plan:     Encounter for postpartum visit  - will collect pap at procedure visit  - RTC 1 year for WWE  Encounter for initial prescription of other contraceptives  -     Device Authorization Order  -     RTC 1 week for paragard placement         "

## 2025-04-11 ENCOUNTER — PROCEDURE VISIT (OUTPATIENT)
Dept: OBSTETRICS AND GYNECOLOGY | Facility: CLINIC | Age: 30
End: 2025-04-11
Payer: MEDICAID

## 2025-04-11 VITALS
BODY MASS INDEX: 34.56 KG/M2 | SYSTOLIC BLOOD PRESSURE: 122 MMHG | WEIGHT: 187.81 LBS | DIASTOLIC BLOOD PRESSURE: 82 MMHG | HEIGHT: 62 IN

## 2025-04-11 DIAGNOSIS — Z01.419 ENCOUNTER FOR GYNECOLOGICAL EXAMINATION: ICD-10-CM

## 2025-04-11 DIAGNOSIS — Z30.430 ENCOUNTER FOR IUD INSERTION: Primary | ICD-10-CM

## 2025-04-11 DIAGNOSIS — Z97.5 IUD (INTRAUTERINE DEVICE) IN PLACE: ICD-10-CM

## 2025-04-11 LAB
B-HCG UR QL: NEGATIVE
CTP QC/QA: YES

## 2025-04-11 RX ORDER — IBUPROFEN 800 MG/1
800 TABLET ORAL 3 TIMES DAILY
Qty: 90 TABLET | Refills: 1 | Status: SHIPPED | OUTPATIENT
Start: 2025-04-11

## 2025-04-11 NOTE — PROCEDURES
Insertion of IUD    Date/Time: 4/11/2025 10:00 AM    Performed by: Quin Cameron CNM  Authorized by: Quin Cameron CNM    Consent:     Consent given by:  Patient    Procedure risks and benefits discussed: yes      Patient questions answered: yes      Patient agrees, verbalizes understanding, and wants to proceed: yes     Device to be inserted was verified by patient: yes    Educational handouts given: yes      Instructions and paperwork completed: yes    Insertion Procedure:   380 mm copper 380 mm2       Pelvic exam performed: yes      Negative urine pregnancy test: yes      Cervix cleaned and prepped: yes      Speculum placed in vagina: yes      Tenaculum applied to cervix: yes      Uterus sounded: yes      Uterus sound depth (cm):  7    IUD inserted with no complications: yes      IUD type:  ParaGard    Strings trimmed: yes    Post-procedure:     Patient tolerated procedure well: yes      Patient will follow up after next period: yes

## 2025-04-26 ENCOUNTER — RESULTS FOLLOW-UP (OUTPATIENT)
Dept: OBSTETRICS AND GYNECOLOGY | Facility: HOSPITAL | Age: 30
End: 2025-04-26